# Patient Record
Sex: MALE | Race: BLACK OR AFRICAN AMERICAN | NOT HISPANIC OR LATINO | Employment: FULL TIME | URBAN - METROPOLITAN AREA
[De-identification: names, ages, dates, MRNs, and addresses within clinical notes are randomized per-mention and may not be internally consistent; named-entity substitution may affect disease eponyms.]

---

## 2017-02-27 ENCOUNTER — HOSPITAL ENCOUNTER (OUTPATIENT)
Dept: NUTRITION | Facility: CLINIC | Age: 37
Discharge: HOME | End: 2017-02-27

## 2017-02-27 DIAGNOSIS — R07.9 CHEST PAIN, UNSPECIFIED: ICD-10-CM

## 2017-02-27 DIAGNOSIS — E78.2 MIXED HYPERLIPIDEMIA: ICD-10-CM

## 2017-02-27 DIAGNOSIS — R73.03 PRE-DIABETES: ICD-10-CM

## 2017-02-27 DIAGNOSIS — K76.0 FATTY LIVER: ICD-10-CM

## 2021-06-01 ENCOUNTER — RECORDS - HEALTHEAST (OUTPATIENT)
Dept: ADMINISTRATIVE | Facility: CLINIC | Age: 41
End: 2021-06-01

## 2021-06-09 NOTE — PROGRESS NOTES
"Nutrition Assessment/education    Patient seen for out patient MNT inital assessment.    Referring diagnosis: Decrease CV risk, hx of prediabetes, hyperlipidemia, obesity, hypertension, hypertriglyceridemia  Height:5'11\"  Weight:  252 lbs   BMI:35.2  BMI indication: 35-39.9 obesity (class 2)   Weight goal:  230 lbs   Weight history:  Weighed 266 lbs in January, 2016                             Seven years ago he weighed 225 lbs.     Nutrition intervention that addressed medical nutrition therapy for above diagnosis: Discussed healthy food choices for an 1800 calorie diet with 14 grams of saturated fat per day, high fiber foods, limiting sodium to 1500 - 2400 mg sodium per day. Discussed recommended foods, reviewed label reading, eating out and recommended 150 minutes of moderate physical activity per week.     Assessment of diet/weight history: Patient stated he lost 14 pounds since January by watching portion sizes and increasing physical activity.  Typical meals include:  For breakfast and lunch a green smoothie that includes kale, berries and coconut water and he eats 2 hard boiled eggs for protein.  For dinner he eats a variety of foods including chicken, pork chops, ribs,  16 ounces of hamburger,  lots of rice and soto beans, peas, broccoli. He stated his meat serving sizes tend to be fairly large.  Patient reports he does not use much salt in cooking and does not buy many salty/processed foods but he does like pickles.   Patient reports he is real hungry after his breakfast and lunch meals  so he plans on adding a small serving of low sodium trail mix to his breakfast and lunch. Patient has been tracking his food on the [employer] ashley and is usually eating around 1500 calories. Beverages include green tea(no sugar added), he does not drink milk and seldom drinks alcohol.     Assessment of physical activity: Since January he started walking on the treadmill 5 days per week.  One day for 30 minutes and then " the next day for 60 minutes.  He also lifts 160 pound weights on Mondays, Wednesdays and Fridays. He does 10 reps.  He also does some curls using 40 pound weights.  He is also very physically active at work lifting boxes all day long.     Goals:  Patient stated he plans on watching his portion sizes closer.   He will cut back from 3 large hamburgers to 2 hamburgers and add more non starchy vegetables to his dinner meal. R ecommended he limit meat/chicken intake to about 6 ounces for dinner.  He plans on buying lean cuts of meats and continue to remove skin from poultry. He plans on buying a butter/canola blend instead of butter. He will watch portion sizes of soto beans (he buys the dried beans to limit sodium content).  Patient will add a low sodium trail mix to his breakfast and lunch meals to help curb his hunger level.   Patient will increase his  calories to 1800 calories per day (more realistic with his level of physical activity and age)  with 14 grams of saturated fat per day.  He plans on continuing to track his food intake on [employer] ashley.   Patient will read labels for saturated fat and sodium intake.   Patient plans on continuing with physical activity and once he reaches his weight goal of 230 pounds he plans on walking on the treadmill 3 days per week for one hour.       Patient had no barriers to learning, verbalized understanding, and compliance is expected.    Phone number provided for questions. Recommended follow-up in  as needed.    Thank you for this consult. Call me at [phone] for questions.  LAURIE Acevedo

## 2023-02-10 ENCOUNTER — TRANSFERRED RECORDS (OUTPATIENT)
Dept: HEALTH INFORMATION MANAGEMENT | Facility: CLINIC | Age: 43
End: 2023-02-10

## 2023-02-17 ENCOUNTER — TRANSFERRED RECORDS (OUTPATIENT)
Dept: HEALTH INFORMATION MANAGEMENT | Facility: CLINIC | Age: 43
End: 2023-02-17
Payer: COMMERCIAL

## 2023-03-08 ENCOUNTER — APPOINTMENT (OUTPATIENT)
Dept: RADIOLOGY | Facility: CLINIC | Age: 43
DRG: 730 | End: 2023-03-08
Attending: INTERNAL MEDICINE
Payer: COMMERCIAL

## 2023-03-08 ENCOUNTER — APPOINTMENT (OUTPATIENT)
Dept: ULTRASOUND IMAGING | Facility: CLINIC | Age: 43
DRG: 730 | End: 2023-03-08
Attending: EMERGENCY MEDICINE
Payer: COMMERCIAL

## 2023-03-08 ENCOUNTER — APPOINTMENT (OUTPATIENT)
Dept: CT IMAGING | Facility: CLINIC | Age: 43
DRG: 730 | End: 2023-03-08
Attending: NURSE PRACTITIONER
Payer: COMMERCIAL

## 2023-03-08 ENCOUNTER — HOSPITAL ENCOUNTER (INPATIENT)
Facility: CLINIC | Age: 43
LOS: 1 days | Discharge: HOME | DRG: 730 | End: 2023-03-10
Attending: EMERGENCY MEDICINE | Admitting: INTERNAL MEDICINE
Payer: COMMERCIAL

## 2023-03-08 DIAGNOSIS — N50.811 TESTICULAR PAIN, RIGHT: ICD-10-CM

## 2023-03-08 LAB
ALBUMIN SERPL-MCNC: 4.1 G/DL (ref 3.5–5)
ALBUMIN UR-MCNC: NEGATIVE MG/DL
ALP SERPL-CCNC: 67 U/L (ref 45–120)
ALT SERPL W P-5'-P-CCNC: 53 U/L (ref 0–45)
ANION GAP SERPL CALCULATED.3IONS-SCNC: 10 MMOL/L (ref 5–18)
APPEARANCE UR: CLEAR
AST SERPL W P-5'-P-CCNC: 23 U/L (ref 0–40)
BASOPHILS # BLD AUTO: 0 10E3/UL (ref 0–0.2)
BASOPHILS NFR BLD AUTO: 0 %
BILIRUB SERPL-MCNC: 1 MG/DL (ref 0–1)
BILIRUB UR QL STRIP: NEGATIVE
BUN SERPL-MCNC: 17 MG/DL (ref 8–22)
CALCIUM SERPL-MCNC: 9.5 MG/DL (ref 8.5–10.5)
CHLORIDE BLD-SCNC: 101 MMOL/L (ref 98–107)
CO2 SERPL-SCNC: 24 MMOL/L (ref 22–31)
COLOR UR AUTO: ABNORMAL
CREAT SERPL-MCNC: 1.09 MG/DL (ref 0.7–1.3)
EOSINOPHIL # BLD AUTO: 0 10E3/UL (ref 0–0.7)
EOSINOPHIL NFR BLD AUTO: 0 %
ERYTHROCYTE [DISTWIDTH] IN BLOOD BY AUTOMATED COUNT: 14.2 % (ref 10–15)
GFR SERPL CREATININE-BSD FRML MDRD: 86 ML/MIN/1.73M2
GLUCOSE BLD-MCNC: 243 MG/DL (ref 70–125)
GLUCOSE UR STRIP-MCNC: NEGATIVE MG/DL
HCT VFR BLD AUTO: 39.1 % (ref 40–53)
HGB BLD-MCNC: 12.6 G/DL (ref 13.3–17.7)
HGB UR QL STRIP: ABNORMAL
HOLD SPECIMEN: NORMAL
IMM GRANULOCYTES # BLD: 0 10E3/UL
IMM GRANULOCYTES NFR BLD: 0 %
KETONES UR STRIP-MCNC: NEGATIVE MG/DL
LEUKOCYTE ESTERASE UR QL STRIP: NEGATIVE
LYMPHOCYTES # BLD AUTO: 1 10E3/UL (ref 0.8–5.3)
LYMPHOCYTES NFR BLD AUTO: 9 %
MCH RBC QN AUTO: 27.6 PG (ref 26.5–33)
MCHC RBC AUTO-ENTMCNC: 32.2 G/DL (ref 31.5–36.5)
MCV RBC AUTO: 86 FL (ref 78–100)
MONOCYTES # BLD AUTO: 0.6 10E3/UL (ref 0–1.3)
MONOCYTES NFR BLD AUTO: 6 %
NEUTROPHILS # BLD AUTO: 8.9 10E3/UL (ref 1.6–8.3)
NEUTROPHILS NFR BLD AUTO: 85 %
NITRATE UR QL: NEGATIVE
NRBC # BLD AUTO: 0 10E3/UL
NRBC BLD AUTO-RTO: 0 /100
PH UR STRIP: 6 [PH] (ref 5–7)
PLATELET # BLD AUTO: 214 10E3/UL (ref 150–450)
POTASSIUM BLD-SCNC: 4.2 MMOL/L (ref 3.5–5)
PROCALCITONIN SERPL-MCNC: 0.21 NG/ML (ref 0–0.49)
PROT SERPL-MCNC: 7.5 G/DL (ref 6–8)
RBC # BLD AUTO: 4.57 10E6/UL (ref 4.4–5.9)
RBC URINE: <1 /HPF
SARS-COV-2 RNA RESP QL NAA+PROBE: NEGATIVE
SODIUM SERPL-SCNC: 135 MMOL/L (ref 136–145)
SP GR UR STRIP: 1.01 (ref 1–1.03)
UROBILINOGEN UR STRIP-MCNC: <2 MG/DL
WBC # BLD AUTO: 10.6 10E3/UL (ref 4–11)
WBC URINE: <1 /HPF

## 2023-03-08 PROCEDURE — 99223 1ST HOSP IP/OBS HIGH 75: CPT | Mod: AI | Performed by: INTERNAL MEDICINE

## 2023-03-08 PROCEDURE — U0005 INFEC AGEN DETEC AMPLI PROBE: HCPCS | Performed by: EMERGENCY MEDICINE

## 2023-03-08 PROCEDURE — 96376 TX/PRO/DX INJ SAME DRUG ADON: CPT

## 2023-03-08 PROCEDURE — 250N000011 HC RX IP 250 OP 636: Performed by: EMERGENCY MEDICINE

## 2023-03-08 PROCEDURE — 36415 COLL VENOUS BLD VENIPUNCTURE: CPT | Performed by: INTERNAL MEDICINE

## 2023-03-08 PROCEDURE — 87086 URINE CULTURE/COLONY COUNT: CPT | Performed by: NURSE PRACTITIONER

## 2023-03-08 PROCEDURE — 99285 EMERGENCY DEPT VISIT HI MDM: CPT | Mod: 25

## 2023-03-08 PROCEDURE — 258N000003 HC RX IP 258 OP 636: Performed by: INTERNAL MEDICINE

## 2023-03-08 PROCEDURE — 96374 THER/PROPH/DIAG INJ IV PUSH: CPT

## 2023-03-08 PROCEDURE — 99207 PR APP CREDIT; MD BILLING SHARED VISIT: CPT | Performed by: EMERGENCY MEDICINE

## 2023-03-08 PROCEDURE — C9803 HOPD COVID-19 SPEC COLLECT: HCPCS

## 2023-03-08 PROCEDURE — 84145 PROCALCITONIN (PCT): CPT | Performed by: INTERNAL MEDICINE

## 2023-03-08 PROCEDURE — 81001 URINALYSIS AUTO W/SCOPE: CPT | Performed by: EMERGENCY MEDICINE

## 2023-03-08 PROCEDURE — 250N000011 HC RX IP 250 OP 636: Performed by: INTERNAL MEDICINE

## 2023-03-08 PROCEDURE — 85025 COMPLETE CBC W/AUTO DIFF WBC: CPT | Performed by: INTERNAL MEDICINE

## 2023-03-08 PROCEDURE — 96375 TX/PRO/DX INJ NEW DRUG ADDON: CPT

## 2023-03-08 PROCEDURE — 87491 CHLMYD TRACH DNA AMP PROBE: CPT | Performed by: NURSE PRACTITIONER

## 2023-03-08 PROCEDURE — 71045 X-RAY EXAM CHEST 1 VIEW: CPT

## 2023-03-08 PROCEDURE — G0378 HOSPITAL OBSERVATION PER HR: HCPCS

## 2023-03-08 PROCEDURE — 74178 CT ABD&PLV WO CNTR FLWD CNTR: CPT

## 2023-03-08 PROCEDURE — 250N000013 HC RX MED GY IP 250 OP 250 PS 637: Performed by: NURSE PRACTITIONER

## 2023-03-08 PROCEDURE — 80053 COMPREHEN METABOLIC PANEL: CPT | Performed by: INTERNAL MEDICINE

## 2023-03-08 PROCEDURE — 36415 COLL VENOUS BLD VENIPUNCTURE: CPT | Performed by: FAMILY MEDICINE

## 2023-03-08 PROCEDURE — 93976 VASCULAR STUDY: CPT

## 2023-03-08 PROCEDURE — 87591 N.GONORRHOEAE DNA AMP PROB: CPT | Performed by: NURSE PRACTITIONER

## 2023-03-08 PROCEDURE — 96365 THER/PROPH/DIAG IV INF INIT: CPT

## 2023-03-08 PROCEDURE — 250N000013 HC RX MED GY IP 250 OP 250 PS 637: Performed by: EMERGENCY MEDICINE

## 2023-03-08 PROCEDURE — 96372 THER/PROPH/DIAG INJ SC/IM: CPT | Performed by: EMERGENCY MEDICINE

## 2023-03-08 PROCEDURE — 250N000013 HC RX MED GY IP 250 OP 250 PS 637: Performed by: INTERNAL MEDICINE

## 2023-03-08 PROCEDURE — 96367 TX/PROPH/DG ADDL SEQ IV INF: CPT

## 2023-03-08 PROCEDURE — 87040 BLOOD CULTURE FOR BACTERIA: CPT | Performed by: FAMILY MEDICINE

## 2023-03-08 RX ORDER — NALOXONE HYDROCHLORIDE 0.4 MG/ML
0.2 INJECTION, SOLUTION INTRAMUSCULAR; INTRAVENOUS; SUBCUTANEOUS
Status: DISCONTINUED | OUTPATIENT
Start: 2023-03-08 | End: 2023-03-10 | Stop reason: HOSPADM

## 2023-03-08 RX ORDER — HYDROCHLOROTHIAZIDE 25 MG/1
25 TABLET ORAL DAILY
Status: DISCONTINUED | OUTPATIENT
Start: 2023-03-08 | End: 2023-03-10 | Stop reason: HOSPADM

## 2023-03-08 RX ORDER — HYDROMORPHONE HCL IN WATER/PF 6 MG/30 ML
0.2 PATIENT CONTROLLED ANALGESIA SYRINGE INTRAVENOUS EVERY 4 HOURS PRN
Status: DISCONTINUED | OUTPATIENT
Start: 2023-03-08 | End: 2023-03-08

## 2023-03-08 RX ORDER — METOPROLOL TARTRATE 25 MG/1
50 TABLET, FILM COATED ORAL 2 TIMES DAILY
Status: DISCONTINUED | OUTPATIENT
Start: 2023-03-08 | End: 2023-03-10 | Stop reason: HOSPADM

## 2023-03-08 RX ORDER — CITALOPRAM HYDROBROMIDE 10 MG/1
40 TABLET ORAL DAILY
Status: DISCONTINUED | OUTPATIENT
Start: 2023-03-08 | End: 2023-03-10 | Stop reason: HOSPADM

## 2023-03-08 RX ORDER — SIMVASTATIN 40 MG
40 TABLET ORAL DAILY
COMMUNITY
Start: 2021-06-01 | End: 2023-03-21

## 2023-03-08 RX ORDER — ONDANSETRON 4 MG/1
4 TABLET, ORALLY DISINTEGRATING ORAL EVERY 6 HOURS PRN
Status: DISCONTINUED | OUTPATIENT
Start: 2023-03-08 | End: 2023-03-10 | Stop reason: HOSPADM

## 2023-03-08 RX ORDER — NALOXONE HYDROCHLORIDE 0.4 MG/ML
0.4 INJECTION, SOLUTION INTRAMUSCULAR; INTRAVENOUS; SUBCUTANEOUS
Status: DISCONTINUED | OUTPATIENT
Start: 2023-03-08 | End: 2023-03-10 | Stop reason: HOSPADM

## 2023-03-08 RX ORDER — DOXYCYCLINE 100 MG/1
100 CAPSULE ORAL EVERY 12 HOURS SCHEDULED
Status: DISCONTINUED | OUTPATIENT
Start: 2023-03-08 | End: 2023-03-08

## 2023-03-08 RX ORDER — ONDANSETRON 2 MG/ML
4 INJECTION INTRAMUSCULAR; INTRAVENOUS EVERY 6 HOURS PRN
Status: DISCONTINUED | OUTPATIENT
Start: 2023-03-08 | End: 2023-03-10 | Stop reason: HOSPADM

## 2023-03-08 RX ORDER — ACETAMINOPHEN 325 MG/1
650 TABLET ORAL EVERY 6 HOURS PRN
Status: DISCONTINUED | OUTPATIENT
Start: 2023-03-08 | End: 2023-03-10 | Stop reason: HOSPADM

## 2023-03-08 RX ORDER — SIMVASTATIN 20 MG
40 TABLET ORAL AT BEDTIME
Status: DISCONTINUED | OUTPATIENT
Start: 2023-03-08 | End: 2023-03-10 | Stop reason: HOSPADM

## 2023-03-08 RX ORDER — ACETAMINOPHEN 650 MG/1
650 SUPPOSITORY RECTAL EVERY 6 HOURS PRN
Status: DISCONTINUED | OUTPATIENT
Start: 2023-03-08 | End: 2023-03-10 | Stop reason: HOSPADM

## 2023-03-08 RX ORDER — ASPIRIN 81 MG/1
81 TABLET ORAL DAILY
COMMUNITY
End: 2023-03-21

## 2023-03-08 RX ORDER — NAPROXEN 250 MG/1
250 TABLET ORAL 2 TIMES DAILY WITH MEALS
Qty: 40 TABLET | Refills: 0 | Status: SHIPPED | OUTPATIENT
Start: 2023-03-08

## 2023-03-08 RX ORDER — HYDROCODONE BITARTRATE AND ACETAMINOPHEN 5; 325 MG/1; MG/1
1-2 TABLET ORAL EVERY 4 HOURS PRN
Status: DISCONTINUED | OUTPATIENT
Start: 2023-03-08 | End: 2023-03-10 | Stop reason: HOSPADM

## 2023-03-08 RX ORDER — HYDROCHLOROTHIAZIDE 25 MG/1
25 TABLET ORAL DAILY
COMMUNITY
Start: 2021-06-01 | End: 2023-03-21

## 2023-03-08 RX ORDER — DOXYCYCLINE 100 MG/1
100 CAPSULE ORAL EVERY 12 HOURS
Qty: 20 CAPSULE | Refills: 0 | Status: SHIPPED | OUTPATIENT
Start: 2023-03-08 | End: 2023-03-10

## 2023-03-08 RX ORDER — PIPERACILLIN SODIUM, TAZOBACTAM SODIUM 3; .375 G/15ML; G/15ML
3.38 INJECTION, POWDER, LYOPHILIZED, FOR SOLUTION INTRAVENOUS EVERY 8 HOURS
Status: DISCONTINUED | OUTPATIENT
Start: 2023-03-09 | End: 2023-03-10

## 2023-03-08 RX ORDER — KETOROLAC TROMETHAMINE 15 MG/ML
15 INJECTION, SOLUTION INTRAMUSCULAR; INTRAVENOUS EVERY 6 HOURS
Status: DISCONTINUED | OUTPATIENT
Start: 2023-03-08 | End: 2023-03-10 | Stop reason: HOSPADM

## 2023-03-08 RX ORDER — LISINOPRIL 10 MG/1
10 TABLET ORAL DAILY
Status: DISCONTINUED | OUTPATIENT
Start: 2023-03-08 | End: 2023-03-10 | Stop reason: HOSPADM

## 2023-03-08 RX ORDER — CITALOPRAM HYDROBROMIDE 40 MG/1
1 TABLET ORAL DAILY
COMMUNITY
Start: 2021-06-01 | End: 2023-03-21

## 2023-03-08 RX ORDER — METOPROLOL TARTRATE 50 MG
1 TABLET ORAL 2 TIMES DAILY
COMMUNITY
Start: 2021-06-01 | End: 2023-03-21

## 2023-03-08 RX ORDER — PIPERACILLIN SODIUM, TAZOBACTAM SODIUM 3; .375 G/15ML; G/15ML
3.38 INJECTION, POWDER, LYOPHILIZED, FOR SOLUTION INTRAVENOUS ONCE
Status: COMPLETED | OUTPATIENT
Start: 2023-03-08 | End: 2023-03-08

## 2023-03-08 RX ORDER — MULTIPLE VITAMINS W/ MINERALS TAB 9MG-400MCG
1 TAB ORAL DAILY
COMMUNITY

## 2023-03-08 RX ORDER — ASPIRIN 81 MG/1
81 TABLET ORAL DAILY
Status: DISCONTINUED | OUTPATIENT
Start: 2023-03-08 | End: 2023-03-10 | Stop reason: HOSPADM

## 2023-03-08 RX ORDER — LISINOPRIL 10 MG/1
1 TABLET ORAL DAILY
COMMUNITY
Start: 2021-06-01 | End: 2023-03-21

## 2023-03-08 RX ORDER — PIPERACILLIN SODIUM, TAZOBACTAM SODIUM 3; .375 G/15ML; G/15ML
3.38 INJECTION, POWDER, LYOPHILIZED, FOR SOLUTION INTRAVENOUS EVERY 8 HOURS
Status: DISCONTINUED | OUTPATIENT
Start: 2023-03-08 | End: 2023-03-08

## 2023-03-08 RX ORDER — MULTIPLE VITAMINS W/ MINERALS TAB 9MG-400MCG
1 TAB ORAL DAILY
Status: DISCONTINUED | OUTPATIENT
Start: 2023-03-08 | End: 2023-03-08

## 2023-03-08 RX ORDER — IOPAMIDOL 755 MG/ML
100 INJECTION, SOLUTION INTRAVASCULAR ONCE
Status: COMPLETED | OUTPATIENT
Start: 2023-03-08 | End: 2023-03-08

## 2023-03-08 RX ADMIN — PIPERACILLIN AND TAZOBACTAM 3.38 G: 3; .375 INJECTION, POWDER, FOR SOLUTION INTRAVENOUS at 21:23

## 2023-03-08 RX ADMIN — DOXYCYCLINE HYCLATE 100 MG: 100 CAPSULE ORAL at 20:10

## 2023-03-08 RX ADMIN — HYDROMORPHONE HYDROCHLORIDE 1 MG: 1 INJECTION, SOLUTION INTRAMUSCULAR; INTRAVENOUS; SUBCUTANEOUS at 02:57

## 2023-03-08 RX ADMIN — KETOROLAC TROMETHAMINE 15 MG: 15 INJECTION, SOLUTION INTRAMUSCULAR; INTRAVENOUS at 12:49

## 2023-03-08 RX ADMIN — LISINOPRIL 10 MG: 10 TABLET ORAL at 11:02

## 2023-03-08 RX ADMIN — HYDROCHLOROTHIAZIDE 25 MG: 25 TABLET ORAL at 11:01

## 2023-03-08 RX ADMIN — ASPIRIN 81 MG: 81 TABLET, COATED ORAL at 11:02

## 2023-03-08 RX ADMIN — HYDROCODONE BITARTRATE AND ACETAMINOPHEN 1 TABLET: 5; 325 TABLET ORAL at 11:09

## 2023-03-08 RX ADMIN — CITALOPRAM HYDROBROMIDE 40 MG: 10 TABLET, FILM COATED ORAL at 11:02

## 2023-03-08 RX ADMIN — METOPROLOL TARTRATE 50 MG: 25 TABLET, FILM COATED ORAL at 11:08

## 2023-03-08 RX ADMIN — IOPAMIDOL 100 ML: 755 INJECTION, SOLUTION INTRAVENOUS at 10:49

## 2023-03-08 RX ADMIN — VANCOMYCIN HYDROCHLORIDE 2500 MG: 500 INJECTION, POWDER, LYOPHILIZED, FOR SOLUTION INTRAVENOUS at 22:34

## 2023-03-08 RX ADMIN — ACETAMINOPHEN 650 MG: 325 TABLET ORAL at 19:48

## 2023-03-08 RX ADMIN — METOPROLOL TARTRATE 50 MG: 25 TABLET, FILM COATED ORAL at 20:10

## 2023-03-08 RX ADMIN — DOXYCYCLINE HYCLATE 100 MG: 100 CAPSULE ORAL at 11:08

## 2023-03-08 RX ADMIN — SIMVASTATIN 40 MG: 20 TABLET, FILM COATED ORAL at 20:09

## 2023-03-08 RX ADMIN — KETOROLAC TROMETHAMINE 15 MG: 15 INJECTION, SOLUTION INTRAMUSCULAR; INTRAVENOUS at 20:10

## 2023-03-08 ASSESSMENT — ACTIVITIES OF DAILY LIVING (ADL)
ADLS_ACUITY_SCORE: 35

## 2023-03-08 NOTE — PHARMACY-ADMISSION MEDICATION HISTORY
Pharmacy Note - Admission Medication History    Pertinent Provider Information:     Patient has not had any medications today. Last dose for any of his medications was yesterday PM.     ______________________________________________________________________    Prior To Admission (PTA) med list completed and updated in EMR.       PTA Med List   Medication Sig Last Dose     aspirin 81 MG EC tablet Take 81 mg by mouth daily 3/7/2023 at am     citalopram (CELEXA) 40 MG tablet Take 1 tablet by mouth daily 3/7/2023 at am     hydrochlorothiazide (HYDRODIURIL) 25 MG tablet Take 25 mg by mouth daily 3/7/2023 at am     lisinopril (ZESTRIL) 10 MG tablet Take 1 tablet by mouth daily 3/7/2023 at am     metoprolol tartrate (LOPRESSOR) 50 MG tablet Take 1 tablet by mouth 2 times daily 3/7/2023 at pm     multivitamin w/minerals (THERA-VIT-M) tablet Take 1 tablet by mouth daily 3/7/2023 at am     simvastatin (ZOCOR) 40 MG tablet Take 40 mg by mouth daily 3/7/2023 at pm       Information source(s): Patient  Method of interview communication: in-person      Patient was asked about OTC/herbal products specifically.  PTA med list reflects this.    In the past week, patient estimated taking medication this percent of the time:  greater than 90%.    Medication Affordability:  Not including over the counter (OTC) medications, was there a time in the past 12 months when you did not take your medications as prescribed because of cost?: No    Allergies were reviewed, assessed, and updated with the patient.      Patient does not use any multi-dose medications prior to admission.    The information provided in this note is only as accurate as the sources available at the time of the update(s).    Thank you for the opportunity to participate in the care of this patient.    CEE DAY  3/8/2023 7:49 AM

## 2023-03-08 NOTE — ED NOTES
Patient taken to US. Stable at this time. No signs of distress, sleeping, pain resolved and patient resting as a result of the medication. Will continue to monitor.

## 2023-03-08 NOTE — ED TRIAGE NOTES
Pt arrives from home c/o penile pain. Status post vasectomy on 02/10. Infection prior at site, took ATBs x10 days. Resolved, but pt has pain at site today at 1400 after coughing, went to Urgent Care and workup was neg, neg for testicular torsion per pt when this RN asked. Prescription given but pharmacy closed and pain became unbearable.     Hx. HTN, HLD      Triage Assessment     Row Name 03/08/23 0300       Triage Assessment (Adult)    Airway WDL WDL       Respiratory WDL    Respiratory WDL WDL       Skin Circulation/Temperature WDL    Skin Circulation/Temperature WDL WDL       Cardiac WDL    Cardiac WDL WDL       Peripheral/Neurovascular WDL    Peripheral Neurovascular WDL WDL       Cognitive/Neuro/Behavioral WDL    Cognitive/Neuro/Behavioral WDL WDL

## 2023-03-08 NOTE — ED TRIAGE NOTES
Triage Assessment     Row Name 03/08/23 0300       Triage Assessment (Adult)    Airway WDL WDL       Respiratory WDL    Respiratory WDL WDL       Skin Circulation/Temperature WDL    Skin Circulation/Temperature WDL WDL       Cardiac WDL    Cardiac WDL WDL       Peripheral/Neurovascular WDL    Peripheral Neurovascular WDL WDL       Cognitive/Neuro/Behavioral WDL    Cognitive/Neuro/Behavioral WDL WDL

## 2023-03-08 NOTE — H&P
[facility] MEDICINE ADMISSION HISTORY AND PHYSICAL       Assessment & Plan      1. Right testicular pain. Scrotal US showed --  No evidence of testicular torsion. 2. Moderate right hydrocele containing echogenic fluid, likely representing hemorrhagic or proteinaceous contents. 3. Small left hydrocele containing simple fluid.    No trauma. Not on thinners. No fevers. Low suspicion for infection at this point. However, needs close monitoring     - Poor pain control, and will continue non narcotic, narcotic meds  - Urology eval  - Repeat labs including procal, CBC    2. HTN   - PTA meds    3. Depression  - PTA meds       VTE prophylaxis: SCDs.    Diet: regular   Code Status: Full   COVID vaccination: yes  Barriers to discharge: admitting clinical condition  Discharge Disposition and goals:  Unable to determine at this point, pending clinical progress and response to treatment. Patient may need transfer to SNF or [facility] if unsafe to go home and needed treatment inappropriate at home setting OR may need home health care evaluation if care can be delivered at home settings. Consider referral to care manager/    PPE - I was wearing PPE when I met the patient including but not limited to - N95 mask, Gloves, and/or Safety glasses.      Care plan was created based on available information and patient's condition at the time of encounter. This was discussed with the patient and/or family members using layman's terms, including counseling/education and they have agreed to proceed. I recommend to revise care plan and to review history if there is change in condition and/or new clinical information that is not available during my encounter. At the end of night shift, this case will be presented to the AM Hospitalist.       Jose Gonzalez MD, MPH, FACP, [facility]  Internal Medicine - Hospitalist        Chief Complaint Testicular pain       HISTORY     - I met him in ED-14. He is here for testicular  pain, R.     - He was seen at urgent care for right testicular pain today. Scrotal US showed --  No evidence of testicular torsion. 2. Moderate right hydrocele containing echogenic fluid, likely representing hemorrhagic or proteinaceous contents. 3. Small left hydrocele containing simple fluid.    - Went home, and did OK, woke up after [place] - and pain recur, and unable to control at home  - No penile discharge. No fevers. He said, the pain was preceded by coughing really hard. He is better, no SOB    - History of vasectomy, Feb 10. Had an infection (orchitis and epididymitis) on February 17 and was put on antibiotics for 10 days which he completed.    - In the ED, repeat scrotal US was ordered. No labs.     - ROS --- No headache. No dizziness. No weakness. No CP or SOB. No palpitations. No abdominal pain. No nausea or vomiting. No urinary symptoms. No bleeding symptoms. No weight loss. Rest of 12 point ROS was reviewed and negative.       Past Medical History     HTN    Surgical History     Vasectomy     Family History      No testicular CA     Social History      .  Social History     Socioeconomic History     Marital status:      Spouse name: Not on file     Number of children: Not on file     Years of education: Not on file     Highest education level: Not on file   Occupational History     Not on file   Tobacco Use     Smoking status: Not on file     Smokeless tobacco: Not on file   Substance and Sexual Activity     Alcohol use: Not on file     Drug use: Not on file     Sexual activity: Not on file   Other Topics Concern     Not on file   Social History Narrative     Not on file     Social Determinants of Health     Financial Resource Strain: Not on file   Food Insecurity: Not on file   Transportation Needs: Not on file   Physical Activity: Not on file   Stress: Not on file   Social Connections: Not on file   Intimate Partner Violence: Not on file   Housing Stability: Not on file          Allergies      No  Known Allergies      Prior to Admission Medications      No current facility-administered medications on file prior to encounter.  No current outpatient medications on file prior to encounter.          Review of Systems     A 12 point comprehensive review of systems was negative except as noted above in HPI.    PHYSICAL EXAMINATION       Vitals      Vitals: BP (!) 147/81   Pulse 90   Temp 98.7  F (37.1  C) (Oral)   Resp 20   SpO2 99%   BMI= There is no height or weight on file to calculate BMI.      Examination     General Appearance:  Alert, cooperative, no distress  Head:    Normocephalic, without obvious abnormality, atraumatic  EENT:  PERRL, conjunctiva/corneas clear, EOM's intact.   Neck:   Supple, symmetrical, trachea midline, no adenopathy; no NVE  Back:  Symmetric, no curvature, no CVA tenderness  Chest/Lungs: Clear to auscultation bilaterally, respirations unlabored, No tenderness or deformity. No abdominal breathing or use of accessory muscles.   Heart:    Regular rate and rhythm, S1 and S2 normal, no murmur, rub   or gallop  Abdomen: Soft, non-tender, bowel sounds active all four quadrants, not peritoneal on palpation. Not distended  Extremities:  Extremities normal, atraumatic, no swelling   Skin:  Skin color, texture, turgor normal, no rashes or lesion - right scrotum is enlarged, tender to touch, not warm   Neurologic:  Awake and alert, No lateralizing or localizing signs       Pertinent Lab              Pertinent Radiology

## 2023-03-08 NOTE — ED NOTES
Patient wife left bedside: Yeimy [phone].     Patient resting at this time. Stable. Will continue to monitor.

## 2023-03-08 NOTE — CONSULTS
"    [facility] CONSULTATION - TESTICULAR PAIN     Type of Consult: inpatient  Place of Service: [facility]  Reason for consult: Right testicular pain  Request for consult by: Dr. Gonzalez    History of present illness:   Chencho Mane Jr. is a 42 year old male that was admitted for right testicular pain. Urology is being consulted for testicular pain. History obtained through patient and chart review.     Pt is s/p vasectomy 2/10/23. Seen by MIRELLA Mendez PA-C on 2/17/23 for right testicular pain and edema and prescribed 10 day course of doxycyline 100 mg po BID for right epididymitis. Patient reports he completed the antibiotic course and symptoms resolved.     Yesterday, patient was coughing and developed a \"twinge\" in the right testicle. Pain became severe later in the day, prompting him to visit the [facility]. Reports pain also was present in right flank and right abdomen and has resolved. Denies fever/chills. Scrotal/testicular US 3/7/23 showed no evidence of torsion, normal epididymis, moderate right hydrocele containing echogenic fluid (likely representing hemorrhagic or proteinaceous contents) and small left hydrocele containing simple fluid. UA 3/7: benign for infection. WBC 12.0 on 3/7, 10.6 today. Remains afebrile. Voiding without difficulty. Denies drainage from urethra. No penile pain. Denies scrotal trauma, no hx of testicular torsion or masses. Reports monogamous relationship recently. Hx of gonorrhea at age 15.     Patient notes significant improvement of right testicular pain with narcotic pain medication.     Past Medical History:  No past medical history on file.    Past Surgical history:  No past surgical history on file.    Social History:  Social History     Socioeconomic History     Marital status:      Spouse name: Not on file     Number of children: Not on file     Years of education: Not on file     Highest education level: Not on file   Occupational History "     Not on file   Tobacco Use     Smoking status: Never     Smokeless tobacco: Never   Substance and Sexual Activity     Alcohol use: Not on file     Drug use: Not on file     Sexual activity: Not on file   Other Topics Concern     Not on file   Social History Narrative     Not on file     Social Determinants of Health     Financial Resource Strain: Not on file   Food Insecurity: Not on file   Transportation Needs: Not on file   Physical Activity: Not on file   Stress: Not on file   Social Connections: Not on file   Intimate Partner Violence: Not on file   Housing Stability: Not on file       Medications:  Current Facility-Administered Medications   Medication     acetaminophen (TYLENOL) tablet 650 mg    Or     acetaminophen (TYLENOL) Suppository 650 mg     HYDROcodone-acetaminophen (NORCO) 5-325 MG per tablet 1-2 tablet     HYDROmorphone (DILAUDID) injection 0.2 mg     melatonin tablet 1 mg     naloxone (NARCAN) injection 0.2 mg    Or     naloxone (NARCAN) injection 0.4 mg    Or     naloxone (NARCAN) injection 0.2 mg    Or     naloxone (NARCAN) injection 0.4 mg     ondansetron (ZOFRAN ODT) ODT tab 4 mg    Or     ondansetron (ZOFRAN) injection 4 mg     Current Outpatient Medications   Medication     aspirin 81 MG EC tablet     citalopram (CELEXA) 40 MG tablet     hydrochlorothiazide (HYDRODIURIL) 25 MG tablet     lisinopril (ZESTRIL) 10 MG tablet     metoprolol tartrate (LOPRESSOR) 50 MG tablet     multivitamin w/minerals (THERA-VIT-M) tablet     simvastatin (ZOCOR) 40 MG tablet       Allergies:   No Known Allergies    Review of systems:   A full 10 point review of systems was taken and is negative aside from what is noted above in the HPI    Physical exam:  /60 (BP Location: Left arm)   Pulse 86   Temp 98  F (36.7  C) (Oral)   Resp 16   SpO2 95%    General: NAD, alert, cooperative  Head: normocephalic, without abnormality / atraumatic  Abdomen: soft, non- tender, non- distended. No suprapubic  fullness/tenderness.   Geniturinary: Significant tenderness noted with palpation and manipulation of the right testicle, right testicle more firm and enlarged than left. No scrotal edema, erythema, excessive warmth, crepitus, necrotic lesions and no left testicular pain or edema. Prostate smooth, non-tender on FERNANDO.  Skin: no rashes or lesions.  Musculoskeletal: moves all four extremities equally.   Psychological: alert and oriented, answers questions appropriately.     Labs:   Lab Results   Component Value Date     03/08/2023     11/30/2009    CO2 24 03/08/2023    CO2 37 11/30/2009    BUN 17 03/08/2023    BUN 20 11/30/2009     Lab Results   Component Value Date    WBC 10.6 03/08/2023    HGB 12.6 03/08/2023    HCT 39.1 03/08/2023    MCV 86 03/08/2023     03/08/2023       Lab Results: personally reviewed     Imaging:  EXAM: US TESTICULAR AND SCROTUM WITH DOPPLER LIMITED  LOCATION: [facility]  DATE/TIME: 3/8/2023 4:36 AM     INDICATION: Right testicular pain  COMPARISON: Ultrasound scrotum with Doppler evaluation the seventh 2023.  TECHNIQUE: Ultrasound of scrotum with color flow and spectral Doppler with waveform analysis performed.     FINDINGS:     RIGHT: Right testis measures 4.2 x 2.7 x 2.8 cm. No discrete testicular nodule or mass. A few small parenchymal calcifications, a nonspecific finding. Normal Doppler vascularity. Normal epididymis. Moderate hydrocele with a few echogenic foci. No   varicocele.     LEFT: Left testis measures 4.6 x 2.3 x 3.1 cm. No discrete testicular nodule or mass. A few scattered parenchymal calcifications, a nonspecific finding. Normal Doppler vascularity. Normal epididymis. Small hydrocele without echogenic foci. No varicocele.                                                                      IMPRESSION:  1.  No discrete testicular nodule/mass or abnormal vascularity to suggest torsion on either side. A few small parenchymal  calcifications in each testis, a nonspecific finding, unchanged.     2.  Moderate right and small left hydrocele, similar to prior.    I have personally reviewed the imaging reports above.     Assessment and Plan: Chencho Mane Jr. is being seen by [facility] for right testicular pain    - Ultrasound demonstrates: No discrete testicular nodule/mass or abnormal vascularity to suggest torsion on either side. A few small parenchymal calcifications in each testis, a nonspecific finding, unchanged. Moderate right and small left hydrocele. Normal bilateral epididymis.   - Right flank/abdominal pain on 3/7, now resolved.   - UA 3/7 benign for infection. Gonorrhea and chlamydia and urine culture ordered. Doxycycline 100 mg po BID ordered.   - Recommend scrotal support, restricting physical activity,  - Recommend 600 to 800 mg of ibuprofen every 8 hours as needed, may alternate with Tylenol 500 to 1000 mg every 8 hours as needed (do not exceed 3 g)  - Additional workup / testing ordered: CT Abd/Pelvis with contrast.   - Old records reviewed - Epic, [facility], [place]     This case was discussed with:  Dr Darnell Singh    Thank you for consulting [facility] regarding this patient's care. Please contact us with questions or concerns.     Eugene Landa, ARYA, CNP  [facility]    [phone]    ADDENDUM, 1715:   IMAGING:   EXAM: CT ABDOMEN PELVIS W/O and W CONTRAST  LOCATION: [facility]  DATE/TIME: 3/8/2023 11:01 AM     INDICATION: Persistent right right-sided testicular, flank and abdominal pain. Vasectomy 02/10/2023  COMPARISON: Scrotal ultrasounds 3/8/2023 and 3/7/2023, chest radiograph 04/26/2016 and CTA chest 11/30/2009.  TECHNIQUE: CT scan of the abdomen and pelvis was performed before and after injection of IV contrast. Multiplanar reformats were obtained. Dose reduction techniques were used.  CONTRAST: 100ml Isovue 370.       FINDINGS:   LOWER CHEST:  Reticulonodular opacities throughout the basilar segments of the left lower lobe are unchanged from 2009 and consistent with benign postinflammatory scarring. Benign calcified granuloma right lower lobe. Benign calcified right distal   paraesophageal posterior mediastinal lymph node. No pleural effusion. Heart size normal. No pericardial effusion.     HEPATOBILIARY: Moderate diffuse hepatic steatosis and mild hepatic enlargement. Liver is otherwise normal. Gallbladder is contracted. No bile duct dilatation.     PANCREAS: Normal.     SPLEEN: Spleen size within normal limits. Benign calcified granulomas spleen.     ADRENAL GLANDS: Normal.     KIDNEYS/BLADDER: Kidneys, ureters and bladder are normal. No urinary calculi or obstruction.     BOWEL: Normal appendix. No bowel obstruction or inflammatory change. No free air. No free fluid.     LYMPH NODES: Normal.     VASCULATURE: Normal.     PELVIC ORGANS: Vasectomy clips. Fluid in the right spermatic cord within which is a partially visualized varicocele and there is minimal contusion in the subcutaneous fat anterior to the right spermatic cord all of which is presumably related to recent   vasectomy. Prostate and seminal vesicles and intrapelvic portion of the vas differentia unremarkable.     MUSCULOSKELETAL: Normal.                                                                      IMPRESSION:   1.  Vasectomy clips. Fluid in the right spermatic cord within which is a partially visualized varicocele and there is minimal contusion in the subcutaneous fat anterior to the right spermatic cord all of which is presumably related to recent vasectomy..  2.  Moderate diffuse hepatic steatosis and mild hepatic enlargement.   3.  Kidneys, ureters and bladder are normal.    A/P: Right testicular pain  -Varicocele and minimal contusion of subcutaneous fat anterior to right spermatic cord noted on CT.   - UA benign for infection. UC and GG cultures pending. WBC 10.6. Afebrile.    -  Ok for patient to discharge when deemed appropriate per Medicine.   - Continue doxycycline BID at discharge.   - [facility] will arrange follow up with Dr. Singh on Friday, 3/10/23. Pt should return to ED in the interim if symptoms worsen, develops fever/chills or pain is not well controlled.     Updated: Dr. Singh and discussed plan with RN by phone.     Eugene Landa, APRN, CNP

## 2023-03-08 NOTE — ED NOTES
Handoff to Lexis BLANCO. Stable at this time. No signs of distress. Patient resting in bed at this time. Vitally stable. Patient verbalized resolution of pain.

## 2023-03-08 NOTE — ED PROVIDER NOTES
EMERGENCY DEPARTMENT ENCOUNTER      NAME: Chencho Mane Jr.  AGE: 42 year old male  YOB: 1980  MRN: [id number]  EVALUATION DATE & TIME: 3/8/2023  2:13 AM    PCP: Kat Serrato    ED PROVIDER: Edward Mancini D.O.      Chief Complaint   Patient presents with     Penis/Scrotum Problem     Pain       FINAL IMPRESSION:  1. Testicular pain, right        ED COURSE & MEDICAL DECISION MAKIN:45 AM I met with the patient to gather history and to perform my initial exam. I discussed the plan for care while in the Emergency Department.  3:00 AM I spoke with Dr. Agudelo, [facility], who recommends admitting the patient for pain control and he wants a repeat ultrasound and urology will see the patient in the morning.  3:03 AM I rechecked and updated the patient.  3:13 AM I spoke with Dr. Gonzalez, Hospitalist, who accepts the patient for admission.  3:53 AM I spoke with ultrasound.         Pertinent Labs & Imaging studies reviewed. (See chart for details)  42 year old male presents to the Emergency Department for evaluation of right testicular pain.  Initial differential included testicular torsion, epididymitis, hernia.  Patient did have ultrasound performed at the emergency room, that did show hydrocele with hemorrhagic products.  No evidence of epididymitis.  On exam he has no significant testicular swelling, and normal intact cremasteric reflex with normal testicular lie.  He did have flow on the testicle at the emergency room's ultrasound.  This time I do not believe this to represent testicular torsion as well.  However the patient is having very significant testicular pain, and after discussion with urology they recommended admission for pain medication, and they would evaluate him in the morning for further determination of other interventions that may be appropriate.  Discussed the hospitalist who agreed to the admission under observation status.    Medical Decision  Making    History:    Supplemental history from: Documented in chart, if applicable    External Record(s) reviewed: Documented in chart, if applicable.    Work Up:    Chart documentation includes differential considered and any EKGs or imaging independently interpreted by provider, where specified.    In additional to work up documented, I considered the following work up: Documented in chart, if applicable.    External consultation:    Discussion of management with another provider: Documented in chart, if applicable    Complicating factors:    Care impacted by chronic illness: N/A    Care affected by social determinants of health: N/A    Disposition considerations: Admit.        At the conclusion of the encounter I discussed the results of all of the tests and the disposition. The questions were answered. The patient or family acknowledged understanding and was agreeable with the care plan.      HPI    Patient information was obtained from: Patient and wife    Use of : N/A      Chencho Mane Jr. is a 42 year old male who presents to the ED by ambulance for evaluation of testicular pain.    Per Chart Review,   3/7/23 The patient presented to [facility] for evaluation of testicular pain. US Scrotum revealed 1. No evidence of testicular torsion. 2. Moderate right hydrocele containing echogenic fluid, likely representing hemorrhagic or proteinaceous contents. 3. Small left hydrocele containing simple fluid.    Patient reports that he developed a right-sided testicular pain today around 4:30 PM. He reports that his pain feels reminiscent of when he was diagnosed with epididymitis in the past. Patient notes that he had a vasectomy on 2/10 and then got epididymitis and was given a course of antibiotics that he finished on 2/27. Patient denies fevers, dysuria, trauma, or any other concerns at this time.    REVIEW OF SYSTEMS  Constitutional:  Denies fever, chills, weight loss or weakness  Eyes:   No pain, discharge, redness  HENT:  Denies sore throat, ear pain, congestion  Respiratory: No SOB, wheeze or cough  Cardiovascular:  No CP, palpitations  GI:  Denies abdominal pain, nausea, vomiting, diarrhea  : Endorses right-sided testicular pain. Denies dysuria, hematuria  Musculoskeletal:  Denies any new muscle/joint pain, swelling or loss of function.  Skin:  Denies rash, pallor  Neurologic:  Denies headache, focal weakness or sensory changes  Lymph: Denies swollen nodes    All other systems negative unless noted in HPI.    PAST MEDICAL HISTORY:  No past medical history on file.    PAST SURGICAL HISTORY:  No past surgical history on file.      CURRENT MEDICATIONS:    Current Facility-Administered Medications   Medication     acetaminophen (TYLENOL) tablet 650 mg    Or     acetaminophen (TYLENOL) Suppository 650 mg     HYDROcodone-acetaminophen (NORCO) 5-325 MG per tablet 1-2 tablet     HYDROmorphone (DILAUDID) injection 0.2 mg     melatonin tablet 1 mg     ondansetron (ZOFRAN ODT) ODT tab 4 mg    Or     ondansetron (ZOFRAN) injection 4 mg     No current outpatient medications on file.         ALLERGIES:  No Known Allergies    FAMILY HISTORY:  No family history on file.    SOCIAL HISTORY:  Social History     Socioeconomic History     Marital status:        VITALS:  Patient Vitals for the past 24 hrs:   BP Temp Temp src Pulse Resp SpO2   03/08/23 0253 (!) 147/81 98.7  F (37.1  C) Oral 90 20 99 %       PHYSICAL EXAM    VITAL SIGNS: BP (!) 147/81   Pulse 90   Temp 98.7  F (37.1  C) (Oral)   Resp 20   SpO2 99%     General Appearance: Well-appearing, well-nourished, no acute distress   Head:  Normocephalic, without obvious abnormality, atraumatic  Eyes:  PERRL, conjunctiva/corneas clear, EOM's intact,  ENT:  Lips, mucosa, and tongue normal, membranes are moist without pallor  Neck:  Normal ROM, symmetrical, trachea midline    Cardio:  Regular rate and rhythm, no murmur, rub or gallop, 2+ pulses  symmetric in all extremities  Pulm:  Clear to auscultation bilaterally, respirations unlabored,  Abdomen:  Soft, non-tender, no rebound or guarding.  : Pain to palpation primarily in the inferior portion of the right testicle. Cremasteric reflexes intact. No significant swelling.  No inguinal mass  Musculoskeletal: Full ROM, no edema, no cyanosis, good ROM of major joints  Integument:  Warm, Dry, No erythema, No rash.    Neurologic:  Alert & oriented.  No focal deficits appreciated.  Ambulatory.  Psychiatric:  Affect normal, Judgment normal, Mood normal.      LABS  Results for orders placed or performed during the hospital encounter of 03/08/23 (from the past 24 hour(s))   Asymptomatic COVID-19 Virus (Coronavirus) by PCR Nasopharyngeal    Specimen: Nasopharyngeal; Swab   Result Value Ref Range    SARS CoV2 PCR Negative Negative    Narrative    Testing was performed using the Xpert Xpress SARS-CoV-2 Assay on the Cepheid Gene-Xpert Instrument Systems. Additional information about this Emergency Use Authorization (EUA) assay can be found via the Lab Guide. This test should be ordered for the detection of SARS-CoV-2 in individuals who meet SARS-CoV-2 clinical and/or epidemiological criteria as well as from individuals without symptoms or other reasons to suspect COVID-19. Test performance for asymptomatic patients has only been established in anterior nasal swab specimens. This test is for in vitro diagnostic use under the FDA EUA for laboratories certified under CLIA to perform high complexity testing. This test has not been FDA cleared or approved. A negative result does not rule out the presence of PCR inhibitors in the specimen or target RNA concentration below the limit of detection for the assay. The possibility of a false negative should be considered if the patient's recent exposure or clinical presentation suggests COVID-19. This test was validated by the [facility]. This  laboratory is certified under the Clinical Laboratory Improvement Amendments (CLIA) as qualified to perform high complexity laboratory testing.     US Testicular & Scrotum w Doppler Ltd    Narrative    EXAM: US TESTICULAR AND SCROTUM WITH DOPPLER LIMITED  LOCATION: [facility]  DATE/TIME: 3/8/2023 4:36 AM    INDICATION: Right testicular pain  COMPARISON: Ultrasound scrotum with Doppler evaluation the seventh 2023.  TECHNIQUE: Ultrasound of scrotum with color flow and spectral Doppler with waveform analysis performed.    FINDINGS:    RIGHT: Right testis measures 4.2 x 2.7 x 2.8 cm. No discrete testicular nodule or mass. A few small parenchymal calcifications, a nonspecific finding. Normal Doppler vascularity. Normal epididymis. Moderate hydrocele with a few echogenic foci. No   varicocele.    LEFT: Left testis measures 4.6 x 2.3 x 3.1 cm. No discrete testicular nodule or mass. A few scattered parenchymal calcifications, a nonspecific finding. Normal Doppler vascularity. Normal epididymis. Small hydrocele without echogenic foci. No varicocele.      Impression    IMPRESSION:  1.  No discrete testicular nodule/mass or abnormal vascularity to suggest torsion on either side. A few small parenchymal calcifications in each testis, a nonspecific finding, unchanged.    2.  Moderate right and small left hydrocele, similar to prior.         RADIOLOGY  US Testicular & Scrotum w Doppler Ltd   Final Result   IMPRESSION:   1.  No discrete testicular nodule/mass or abnormal vascularity to suggest torsion on either side. A few small parenchymal calcifications in each testis, a nonspecific finding, unchanged.      2.  Moderate right and small left hydrocele, similar to prior.             MEDICATIONS GIVEN IN THE EMERGENCY:  Medications   melatonin tablet 1 mg (has no administration in time range)   ondansetron (ZOFRAN ODT) ODT tab 4 mg (has no administration in time range)     Or   ondansetron (ZOFRAN)  injection 4 mg (has no administration in time range)   acetaminophen (TYLENOL) tablet 650 mg (has no administration in time range)     Or   acetaminophen (TYLENOL) Suppository 650 mg (has no administration in time range)   HYDROcodone-acetaminophen (NORCO) 5-325 MG per tablet 1-2 tablet (has no administration in time range)   HYDROmorphone (DILAUDID) injection 0.2 mg (has no administration in time range)   HYDROmorphone (DILAUDID) injection 1 mg (1 mg Intramuscular $Given 3/8/23 9533)       NEW PRESCRIPTIONS STARTED AT TODAY'S ER VISIT  New Prescriptions    No medications on file        I, Zeeshan De Los Santos, am serving as a scribe to document services personally performed by Edward Mancini D.O., based on my observations and the provider's statements to me.  I, Edward Mancini D.O., attest that Zeeshan De Los Santos is acting in a scribe capacity, has observed my performance of the services and has documented them in accordance with my direction.     Edward Mancini D.O.  Emergency Medicine  [facility] EMERGENCY ROOM  [address] [facility] [id number] [phone]  Dept: [phone]       Edward Mancini,   03/08/23 0608

## 2023-03-08 NOTE — ED NOTES
Patient seen. Restless, c/o pain at this time. Triaged by this RN. Patient assessment as documented. Seen by Provider. To medicate by this RN. Partner at bedside. Will continue to monitor.

## 2023-03-08 NOTE — PROGRESS NOTES
[facility] MEDICINE PROGRESS NOTE      Summary:  42 year old male into [facility] on 3/7/2023 for right testicular pain  That seemed to begin abruptly after a coughing spell.  PMHx includes a vasectomy  By Dr Santiago with [facility] on 2/10/2023.  On 2/17/2023 he was seen for pain and   Edema. He was treated with doxycycline for a possible post op infection.  Abx lasted  For 10 days.  He resolved the symptoms.    Yesterday he coughed then soon after felt pain in his lower abdomen along  With into the right testicle.  Ultrasound imaging shows a moderately sized  Hydrocele.  (similary to previous imaging)  He was seen in the urgency room.    PSHx includes umbilical hernia repair.    Clinically he feels improved.  CT abdomen/pelvis is pending to rule out kidney  Stones.  Urology consult reviewed.  Likely this is due to the coughing event  From yesterday.  He will be supported with iv toradol and oral oxycodone if  Needed.  Expected hospital stay is short.  The plan was discussed with the  Patient.     Problem list:    1.  Right testicular pain: possible due to orchitis; urology note reviewed; pt was  Put on doxycyline; plan to possible discharge later today if imaging is negative    2.  HTN: continue home meds  3.  dvt prevention: ambulate      Violeta Colón MD  [facility]  [facility]  Phone: #[phone]  Securely message with the Vocera Web Console (learn more here)  Text page via [employer] Paging/Directory     Interval History/Subjective:      Physical Exam/Objective:  Temp:  [98  F (36.7  C)-98.7  F (37.1  C)] 98  F (36.7  C)  Pulse:  [80-90] 86  Resp:  [16-20] 16  BP: (133-154)/(60-81) 133/60  SpO2:  [93 %-99 %] 95 %  There is no height or weight on file to calculate BMI.    GENERAL:  Alert, appears comfortable, in no acute distress, appears stated age   HEAD:  Normocephalic, without obvious abnormality, atraumatic   EYES:  PERRL,  conjunctiva/corneas clear, no scleral icterus, EOM's intact   NOSE: Nares normal, septum midline, mucosa normal, no drainage   THROAT: Lips, mucosa, and tongue normal; teeth and gums normal, mouth moist   NECK: Supple, symmetrical, trachea midline   BACK:   Symmetric, no curvature, ROM normal   LUNGS:   Clear to auscultation bilaterally, no rales, rhonchi, or wheezing, symmetric chest rise on inhalation, respirations unlabored   CHEST WALL:  No tenderness or deformity   HEART:  Regular rate and rhythm, S1 and S2 normal, no murmur, rub, or gallop    ABDOMEN:   Soft, non-tender, bowel sounds active all four quadrants, no masses, no organomegaly, no rebound or guarding;  exam deferred to urology   EXTREMITIES: Extremities normal, atraumatic, no cyanosis or edema    SKIN: Dry to touch, no exanthems in the visualized areas   NEURO: Alert, oriented x3, moves all four extremities freely   PSYCH: Cooperative, behavior is appropriate      Data reviewed today: I personally reviewed all new medications, labs, imaging/diagnostics reports over the past 24 hours. Pertinent findings include:    Imaging:   Recent Results (from the past 24 hour(s))   US Testicular & Scrotum w Doppler Ltd    Narrative    EXAM: US TESTICULAR AND SCROTUM WITH DOPPLER LIMITED  LOCATION: [facility]  DATE/TIME: 3/8/2023 4:36 AM    INDICATION: Right testicular pain  COMPARISON: Ultrasound scrotum with Doppler evaluation the seventh 2023.  TECHNIQUE: Ultrasound of scrotum with color flow and spectral Doppler with waveform analysis performed.    FINDINGS:    RIGHT: Right testis measures 4.2 x 2.7 x 2.8 cm. No discrete testicular nodule or mass. A few small parenchymal calcifications, a nonspecific finding. Normal Doppler vascularity. Normal epididymis. Moderate hydrocele with a few echogenic foci. No   varicocele.    LEFT: Left testis measures 4.6 x 2.3 x 3.1 cm. No discrete testicular nodule or mass. A few scattered parenchymal  calcifications, a nonspecific finding. Normal Doppler vascularity. Normal epididymis. Small hydrocele without echogenic foci. No varicocele.      Impression    IMPRESSION:  1.  No discrete testicular nodule/mass or abnormal vascularity to suggest torsion on either side. A few small parenchymal calcifications in each testis, a nonspecific finding, unchanged.    2.  Moderate right and small left hydrocele, similar to prior.       Labs:  Most Recent 3 BMP's:Recent Labs   Lab Test 03/08/23  0639   *   POTASSIUM 4.2   CHLORIDE 101   CO2 24   BUN 17   CR 1.09   ANIONGAP 10   MAYELA 9.5   *       Medications:   Personally Reviewed.  Medications       aspirin  81 mg Oral Daily     citalopram  40 mg Oral Daily     doxycycline hyclate  100 mg Oral Q12H [facility] (08/20)     hydrochlorothiazide  25 mg Oral Daily     lisinopril  10 mg Oral Daily     metoprolol tartrate  50 mg Oral BID     simvastatin  40 mg Oral At Bedtime

## 2023-03-09 PROBLEM — N50.811 TESTICULAR PAIN, RIGHT: Status: ACTIVE | Noted: 2023-03-09

## 2023-03-09 LAB
ALBUMIN UR-MCNC: NEGATIVE MG/DL
ANION GAP SERPL CALCULATED.3IONS-SCNC: 14 MMOL/L (ref 5–18)
ANION GAP SERPL CALCULATED.3IONS-SCNC: 9 MMOL/L (ref 5–18)
APPEARANCE UR: CLEAR
BACTERIA UR CULT: NO GROWTH
BILIRUB UR QL STRIP: NEGATIVE
BUN SERPL-MCNC: 15 MG/DL (ref 8–22)
BUN SERPL-MCNC: 16 MG/DL (ref 8–22)
C TRACH DNA SPEC QL PROBE+SIG AMP: NEGATIVE
CALCIUM SERPL-MCNC: 9.5 MG/DL (ref 8.5–10.5)
CALCIUM SERPL-MCNC: 9.7 MG/DL (ref 8.5–10.5)
CHLORIDE BLD-SCNC: 100 MMOL/L (ref 98–107)
CHLORIDE BLD-SCNC: 101 MMOL/L (ref 98–107)
CO2 SERPL-SCNC: 23 MMOL/L (ref 22–31)
CO2 SERPL-SCNC: 26 MMOL/L (ref 22–31)
COLOR UR AUTO: COLORLESS
CREAT SERPL-MCNC: 1.22 MG/DL (ref 0.7–1.3)
CREAT SERPL-MCNC: 1.22 MG/DL (ref 0.7–1.3)
ERYTHROCYTE [DISTWIDTH] IN BLOOD BY AUTOMATED COUNT: 14.6 % (ref 10–15)
GFR SERPL CREATININE-BSD FRML MDRD: 75 ML/MIN/1.73M2
GFR SERPL CREATININE-BSD FRML MDRD: 75 ML/MIN/1.73M2
GLUCOSE BLD-MCNC: 182 MG/DL (ref 70–125)
GLUCOSE BLD-MCNC: 219 MG/DL (ref 70–125)
GLUCOSE UR STRIP-MCNC: NEGATIVE MG/DL
HCT VFR BLD AUTO: 40.2 % (ref 40–53)
HGB BLD-MCNC: 12.9 G/DL (ref 13.3–17.7)
HGB UR QL STRIP: NEGATIVE
KETONES UR STRIP-MCNC: NEGATIVE MG/DL
LACTATE SERPL-SCNC: 1.3 MMOL/L (ref 0.7–2)
LEUKOCYTE ESTERASE UR QL STRIP: NEGATIVE
MCH RBC QN AUTO: 27.9 PG (ref 26.5–33)
MCHC RBC AUTO-ENTMCNC: 32.1 G/DL (ref 31.5–36.5)
MCV RBC AUTO: 87 FL (ref 78–100)
N GONORRHOEA DNA SPEC QL NAA+PROBE: NEGATIVE
NITRATE UR QL: NEGATIVE
PH UR STRIP: 6 [PH] (ref 5–7)
PLATELET # BLD AUTO: 210 10E3/UL (ref 150–450)
POTASSIUM BLD-SCNC: 3.8 MMOL/L (ref 3.5–5)
POTASSIUM BLD-SCNC: 3.8 MMOL/L (ref 3.5–5)
RBC # BLD AUTO: 4.62 10E6/UL (ref 4.4–5.9)
RBC URINE: <1 /HPF
SODIUM SERPL-SCNC: 136 MMOL/L (ref 136–145)
SODIUM SERPL-SCNC: 137 MMOL/L (ref 136–145)
SP GR UR STRIP: 1.01 (ref 1–1.03)
SQUAMOUS EPITHELIAL: <1 /HPF
UROBILINOGEN UR STRIP-MCNC: <2 MG/DL
WBC # BLD AUTO: 6.5 10E3/UL (ref 4–11)
WBC URINE: 1 /HPF

## 2023-03-09 PROCEDURE — 80048 BASIC METABOLIC PNL TOTAL CA: CPT | Performed by: INTERNAL MEDICINE

## 2023-03-09 PROCEDURE — 36415 COLL VENOUS BLD VENIPUNCTURE: CPT | Performed by: EMERGENCY MEDICINE

## 2023-03-09 PROCEDURE — 87798 DETECT AGENT NOS DNA AMP: CPT | Performed by: INTERNAL MEDICINE

## 2023-03-09 PROCEDURE — 36415 COLL VENOUS BLD VENIPUNCTURE: CPT | Performed by: INTERNAL MEDICINE

## 2023-03-09 PROCEDURE — 99222 1ST HOSP IP/OBS MODERATE 55: CPT | Performed by: INTERNAL MEDICINE

## 2023-03-09 PROCEDURE — 250N000011 HC RX IP 250 OP 636: Performed by: INTERNAL MEDICINE

## 2023-03-09 PROCEDURE — 81001 URINALYSIS AUTO W/SCOPE: CPT | Performed by: INTERNAL MEDICINE

## 2023-03-09 PROCEDURE — 258N000003 HC RX IP 258 OP 636: Performed by: INTERNAL MEDICINE

## 2023-03-09 PROCEDURE — 120N000001 HC R&B MED SURG/OB

## 2023-03-09 PROCEDURE — 250N000013 HC RX MED GY IP 250 OP 250 PS 637: Performed by: INTERNAL MEDICINE

## 2023-03-09 PROCEDURE — 96366 THER/PROPH/DIAG IV INF ADDON: CPT

## 2023-03-09 PROCEDURE — G0378 HOSPITAL OBSERVATION PER HR: HCPCS

## 2023-03-09 PROCEDURE — 250N000013 HC RX MED GY IP 250 OP 250 PS 637: Performed by: EMERGENCY MEDICINE

## 2023-03-09 PROCEDURE — 96376 TX/PRO/DX INJ SAME DRUG ADON: CPT

## 2023-03-09 PROCEDURE — 87040 BLOOD CULTURE FOR BACTERIA: CPT | Performed by: EMERGENCY MEDICINE

## 2023-03-09 PROCEDURE — 85027 COMPLETE CBC AUTOMATED: CPT | Performed by: INTERNAL MEDICINE

## 2023-03-09 PROCEDURE — 82310 ASSAY OF CALCIUM: CPT | Performed by: EMERGENCY MEDICINE

## 2023-03-09 PROCEDURE — 83605 ASSAY OF LACTIC ACID: CPT | Performed by: EMERGENCY MEDICINE

## 2023-03-09 PROCEDURE — 250N000011 HC RX IP 250 OP 636: Performed by: EMERGENCY MEDICINE

## 2023-03-09 PROCEDURE — 99232 SBSQ HOSP IP/OBS MODERATE 35: CPT | Performed by: EMERGENCY MEDICINE

## 2023-03-09 RX ADMIN — HYDROCODONE BITARTRATE AND ACETAMINOPHEN 1 TABLET: 5; 325 TABLET ORAL at 06:51

## 2023-03-09 RX ADMIN — VANCOMYCIN HYDROCHLORIDE 1250 MG: 5 INJECTION, POWDER, LYOPHILIZED, FOR SOLUTION INTRAVENOUS at 22:45

## 2023-03-09 RX ADMIN — ACETAMINOPHEN 650 MG: 325 TABLET ORAL at 17:17

## 2023-03-09 RX ADMIN — HYDROCHLOROTHIAZIDE 25 MG: 25 TABLET ORAL at 07:57

## 2023-03-09 RX ADMIN — KETOROLAC TROMETHAMINE 15 MG: 15 INJECTION, SOLUTION INTRAMUSCULAR; INTRAVENOUS at 06:41

## 2023-03-09 RX ADMIN — KETOROLAC TROMETHAMINE 15 MG: 15 INJECTION, SOLUTION INTRAMUSCULAR; INTRAVENOUS at 23:11

## 2023-03-09 RX ADMIN — SIMVASTATIN 40 MG: 20 TABLET, FILM COATED ORAL at 21:11

## 2023-03-09 RX ADMIN — PIPERACILLIN AND TAZOBACTAM 3.38 G: 3; .375 INJECTION, POWDER, FOR SOLUTION INTRAVENOUS at 11:15

## 2023-03-09 RX ADMIN — PIPERACILLIN AND TAZOBACTAM 3.38 G: 3; .375 INJECTION, POWDER, FOR SOLUTION INTRAVENOUS at 04:06

## 2023-03-09 RX ADMIN — VANCOMYCIN HYDROCHLORIDE 1250 MG: 5 INJECTION, POWDER, LYOPHILIZED, FOR SOLUTION INTRAVENOUS at 09:29

## 2023-03-09 RX ADMIN — CITALOPRAM HYDROBROMIDE 40 MG: 10 TABLET, FILM COATED ORAL at 07:57

## 2023-03-09 RX ADMIN — LISINOPRIL 10 MG: 10 TABLET ORAL at 07:58

## 2023-03-09 RX ADMIN — PIPERACILLIN AND TAZOBACTAM 3.38 G: 3; .375 INJECTION, POWDER, FOR SOLUTION INTRAVENOUS at 21:08

## 2023-03-09 RX ADMIN — KETOROLAC TROMETHAMINE 15 MG: 15 INJECTION, SOLUTION INTRAMUSCULAR; INTRAVENOUS at 12:04

## 2023-03-09 RX ADMIN — METOPROLOL TARTRATE 50 MG: 25 TABLET, FILM COATED ORAL at 07:57

## 2023-03-09 RX ADMIN — ASPIRIN 81 MG: 81 TABLET, COATED ORAL at 07:58

## 2023-03-09 RX ADMIN — KETOROLAC TROMETHAMINE 15 MG: 15 INJECTION, SOLUTION INTRAMUSCULAR; INTRAVENOUS at 01:18

## 2023-03-09 RX ADMIN — METOPROLOL TARTRATE 50 MG: 25 TABLET, FILM COATED ORAL at 21:11

## 2023-03-09 RX ADMIN — KETOROLAC TROMETHAMINE 15 MG: 15 INJECTION, SOLUTION INTRAMUSCULAR; INTRAVENOUS at 18:09

## 2023-03-09 ASSESSMENT — ACTIVITIES OF DAILY LIVING (ADL)
ADLS_ACUITY_SCORE: 35

## 2023-03-09 NOTE — PROGRESS NOTES
PRIMARY DIAGNOSIS: ACUTE PAIN  OUTPATIENT/OBSERVATION GOALS TO BE MET BEFORE DISCHARGE:  1. Pain Status: Improved-controlled with oral pain medications.    2. Return to near baseline physical activity: Yes    3. Cleared for discharge by consultants (if involved): No    Nurse to notify provider when observation goals have been met and patient is ready for discharge.    Outcome Evaluation: Blood cultures and urinalysis collected. Chest x-ray completed. IV antibiotics infused. Temp 98.2 after tylenol/cold packs. Patient appears more comfortable, no longer clammy. Pain rated 2/10. Patient's spouse, Yeimy, called and was given an update on improvement to fever and plan for infection workup.

## 2023-03-09 NOTE — PROVIDER NOTIFICATION
Paged on-call hospitalist Dr. Nel Jefferson and cross cover Dr. Jimmy Hopper.    Per shift change report, discharge was delayed due to patient complaints of dizziness. His plan was to eat dinner and then go home. When this RN took over he complained of chills, headache, and fatigue. He looks diaphoretic. Temp 103.1 Tylenol administered.

## 2023-03-09 NOTE — PROGRESS NOTES
Place of Service:  [facility]     Reason for follow up: Right testicular pain    SUBJECTIVE:  Events: Fever 103.1 on 3/8 afternoon, 101 overnight, afebrile this AM. Received 2 doses of doxycycline on 3/8, changed to IV Zosyn and Vanco once developed fever.     Patient reports no significant change in right testicular pain or edema, maybe slightly improved from yesterday. Denies left testicular pain, scrotal pain, perineal pain, SP/bilateral flank pain, difficulty voiding.      Diaphoretic this AM but currently afebrile.    Spouse, Yeimy, present.     OBJECTIVE:  PHYSICAL EXAM:  Temp: 98.1  F (36.7  C) Temp src: Oral BP: 126/59 Pulse: 72   Resp: 16 SpO2: 95 % O2 Device: None (Room air)    General: NAD, alert, cooperative  Head: normocephalic, without abnormality / atraumatic  Abdomen: soft, non- tender, non- distended. No suprapubic fullness, no suprapubic tenderness. No bilateral CVA tenderness.   Genitourinary: Significant tenderness noted with palpation and manipulation of the right testicle, right testicle more firm and enlarged than left. No scrotal edema, erythema, excessive warmth, crepitus or necrotic lesions. No left testicular pain or edema.  Skin: No rashes or lesions  Musculoskeletal: moves all four extremities equally.   Psychological: alert and oriented, answers questions appropriately.     LABS:  Creatinine   Date Value Ref Range Status   03/09/2023 1.22 0.70 - 1.30 mg/dL Final   11/30/2009 1.28 (H) 0.66 - 1.25 mg/dL Final     Comment:     New IDMS-traceable calibration  beginning 5/1/08     WBC Count   Date Value Ref Range Status   03/09/2023 6.5 4.0 - 11.0 10e3/uL Final     Hemoglobin   Date Value Ref Range Status   03/09/2023 12.9 (L) 13.3 - 17.7 g/dL Final     Platelet Count   Date Value Ref Range Status   03/09/2023 210 150 - 450 10e3/uL Final       UA:  UA RESULTS:  Recent Labs   Lab Test 03/09/23  0047   COLOR Colorless   APPEARANCE Clear   URINEGLC Negative   URINEBILI Negative    URINEKETONE Negative   SG 1.009   UBLD Negative   URINEPH 6.0   PROTEIN Negative   NITRITE Negative   LEUKEST Negative   RBCU <1   WBCU 1       Cultures:  Urine 3/8/23: pending  Blood 3/8/23: pending  Rio/Chlamydia 3/8/23: pending     Lab Results: personally reviewed.     ASSESSMENT/PLAN:  Chencho Mane Jr. is being seen by [facility] for right testicular pain, suspected epididymo-orchitis    - Fever last evening and overnight. Afebrile this AM. WBC 6.5.  - Testicular/scrotal US 3/8 negative for mass/nodule or abnormal vascularity to suggest to torsion. Moderate right and small left hydroceles noted. CT Abd/Pelvis 3/8: fluid in right spermatic cord,partially visualized varicocele, minimal contusion in subcutaneous fat anterior to right spermatic cord s/p vasectomy.   - UAs benign for infection. UC, BCs and GC culture pending. Received 2 doses of po doxycycline on 3/8, now IV Zosyn and Vanco. Afebrile. WBC WNL. Appreciate ID consult/recommendations.   - No change in exam today. No evidence of scrotal/perineal cellulitis or Florencia's. No surgical intervention indicated at this time.   - Recommend scrotal support, restricting physical activity,  - Recommend 600 to 800 mg of ibuprofen every 8 hours as needed, may alternate with Tylenol 500 to 1000 mg every 8 hours as needed (do not exceed 3 g).  - Will arrange for close follow up with Dr. Singh, timing to be determined by date of discharge.     Updating:  Dr Darnell Landa, ARYA, CNP  [facility]   [phone]     ADDENDUM, 1135:   Case discussed with Dr. Singh.   Afebrile since [place]. If fevers recur, then make NPO.   Dr. Singh planning to see patient this evening.     ARYA Alan, CNP    Patient was seen and examined by me this evening.  He reports that his pain is much improved over the last 36 hours,  He had fevers last night to 103 but has been afebrile x24 hours.  On examination there is tenderness and  induration of the right testis and epididymis.  There is no fluctuance within the scrotal cavity. Plan: There is no indication for surgical intervention. Cont IV abx.  Anticipate transitioning to PO tomorrow.  I would ask ID if it would make sense to add doxycycline to PO levaquin given that he responded to this initially to a simlilar but much milder episode 2 weeks ago?     Darnell Singh MD  3/9/2023  9:53 PM

## 2023-03-09 NOTE — UTILIZATION REVIEW
Admission Status; Secondary Review Determination   Under the authority of the Utilization Management Committee, the utilization review process indicated a secondary review on Chencho Mane Jr.. The review outcome is based on review of the medical records, discussions with staff, and applying clinical experience noted on the date of the review.   (x) Inpatient Status Appropriate - This patient's medical care is consistent with medical management for inpatient care and reasonable inpatient medical practice.     RATIONALE FOR DETERMINATION   Chencho Mane Jr. Is a 42 yr old male who underwent vasectomy on 2/10/23 and presented with testicular pain after coughing on 3/8/23.  He had pain and edema on 2/17/23 and was treated with doxycycline for possible postop infection for 10 days and symptoms resolved.  On 3/7/23 he had pain in right testicle with coughing.  Was started on doxycycline while monitored on Observation and then plan to discharge however developed temp to 103 and discharge was cancelled.  Ongoing fever to 101.  Doxycycline changed to vanco/zosyn.  ID consultation pending.     At the time of admission with the information available to the attending physician more than 2 nights Hospital complex care was anticipated, based on patient risk of adverse outcome if treated as outpatient and complex care required. Inpatient admission is appropriate based on the Medicare guidelines.   The information on this document is developed by the utilization review team in order for the business office to ensure compliance. This only denotes the appropriateness of proper admission status and does not reflect the quality of care rendered.   The definitions of Inpatient Status and Observation Status used in making the determination above are those provided in the CMS Coverage Manual, Chapter 1 and Chapter 6, section 70.4.   Sincerely,   Kamla Helm MD  Utilization Review  Physician Advisor  [facility]  Services

## 2023-03-09 NOTE — PROGRESS NOTES
Consultation - Infectious Disease  [facility]  Chencho Mane Jr.,  1980, MRN [id number]    Admitting Dx: Testicular pain, right [N50.811]    PCP: Kat Serrato, [phone]       ASSESSMENT   42-year-old man who is admitted with right testicular pain.  ID is consulted regarding fevers and pain.    1. Status postvasectomy on 2/10 without complications  2. Right testicular pain.  Started on .  Improved with a course of doxycycline.  Worsening pain developed after episode of coughing 2 days ago.  Unremarkable scrotal ultrasound, but CT scan showing small varicocele and postvasectomy changes.  Denies STI risk factors.  GC/chlamydia urine probe negative.  3. Fever.  Only focal finding is right testicular pain.  Imaging does not show any obvious infection or occult abscess.  Recent URI symptoms with cough and congestion.  COVID screen negative.  Normal white count and procalcitonin.  Blood cultures negative to date.  No parotid swelling.    Active Problems:    * No active hospital problems. *       PLAN   -Agree with empiric vancomycin and Zosyn  -Appreciate urology involvement  -Watch for fever recurrence  -Check urine for mycoplasma  -If symptoms improve, and no further fevers, consider switching to oral levofloxacin x10 days tomorrow      Thank you for this consult. Will follow.    Jose Preciado MD  [facility]  Direct messaging: TVplus Paging  On-Call ID provider: [phone], option: [phone]      ===========================================      Chief Complaint   <principal problem not specified>       HPI     We have been requested by Violeta Colón MD to evaluate Chencho Mane Jr. for the above.    History obtained by patient    Chencho Mane Jr. is a 42 year old man who is admitted with right testicular pain.  The patient had a vasectomy on 2/10 without complications.  However, on  he returned to clinic with right testicular pain.  He was treated  "empirically with doxycycline x10 days which helped.  This past week he noticed the beginnings of a upper respiratory infection.  Tuesday he had a strong cough while he was in his car and developed instant onset of right testicular pain.  Due to severity of pain he came into the ER.  He has been evaluated by urology.  No obvious signs of infection were seen on exam.  Normal prostate exam.  Ultrasound only showed small hydroceles bilaterally.  In the evening he developed a high fever without any other associated symptoms.  A CT scan of the abdomen and pelvis was done which showed no obvious infection, but a small varicocele on the right.  Blood cultures were collected and the patient was started on broad-spectrum antibiotics.  Today, he feels like the pain is improved.  He continues to have point tenderness over the his right testicle.  He denies any rashes or dysuria or urethral discharge.          Review of Systems   Ten systems reviewed and negative except for what is noted in the HPI       Medical History  No past medical history on file. Surgical History  He  has no past surgical history on file.     Social History  Reviewed, and he  reports that he has never smoked. He has never used smokeless tobacco.  Social History     Social History Narrative     Not on file     Family History  family history is not on file.  family history reviewed and is not pertinent to the presenting problem.            Allergies   No Known Allergies      Antibiotics   Doxycycline 3/8-  Zosyn 3/8-  Vancomycin 3/8-    Previous:  None      Physical Exam     Temp:  [98.1  F (36.7  C)-103.1  F (39.5  C)] 98.1  F (36.7  C)  Pulse:  [] 81  Resp:  [14-16] 16  BP: (126-160)/(59-70) 132/70  SpO2:  [94 %-97 %] 97 %    /70   Pulse 81   Temp 98.1  F (36.7  C) (Oral)   Resp 16   Ht 1.803 m (5' 11\")   Wt 117.9 kg (260 lb)   SpO2 97%   BMI 36.26 kg/m      GENERAL:  well-developed, well-nourished, lying in bed in no acute distress. "   HENT:  Head is normocephalic, atraumatic. Oropharynx is moist without exudates or ulcers.  EYES:  Eyes have anicteric sclerae without conjunctival injection or stigmata of endocarditis.   NECK:  Supple.  No lymphadenopathy  LUNGS:  Clear to auscultation.  CARDIOVASCULAR:  Regular rate and rhythm with no murmurs, gallops or rubs.  ABDOMEN:  Normal bowel sounds, soft, nontender. No appreciable hepatosplenomegaly  : Normal external male genitalia without ulcers or lesions. Right testicle is tender with fullness near the epididymis   EXT: Extremities warm and without edema.  SKIN:  No acute rashes. No stigmata of endocarditis.  NEUROLOGIC:  Grossly nonfocal.      Cultures     3/8 blood cultures x2: No growth to date  3/8 urine culture: Negative    Laboratory results     Recent Labs   Lab 03/09/23  0801 03/08/23  0639   WBC 6.5 10.6   HGB 12.9* 12.6*    214       Recent Labs   Lab 03/09/23  0801 03/08/23  0639    135*   CO2 23 24   BUN 15 17   ALBUMIN  --  4.1   ALKPHOS  --  67   ALT  --  53*   AST  --  23       No results for input(s): CRP in the last 168 hours.    Invalid input(s): SEDRATE        Imaging   Radiology results reviewed    US Testicular & Scrotum w Doppler Ltd    Result Date: 3/8/2023  EXAM: US TESTICULAR AND SCROTUM WITH DOPPLER LIMITED LOCATION: [facility] DATE/TIME: 3/8/2023 4:36 AM INDICATION: Right testicular pain COMPARISON: Ultrasound scrotum with Doppler evaluation the seventh 2023. TECHNIQUE: Ultrasound of scrotum with color flow and spectral Doppler with waveform analysis performed. FINDINGS: RIGHT: Right testis measures 4.2 x 2.7 x 2.8 cm. No discrete testicular nodule or mass. A few small parenchymal calcifications, a nonspecific finding. Normal Doppler vascularity. Normal epididymis. Moderate hydrocele with a few echogenic foci. No varicocele. LEFT: Left testis measures 4.6 x 2.3 x 3.1 cm. No discrete testicular nodule or mass. A few scattered  parenchymal calcifications, a nonspecific finding. Normal Doppler vascularity. Normal epididymis. Small hydrocele without echogenic foci. No varicocele.     IMPRESSION: 1.  No discrete testicular nodule/mass or abnormal vascularity to suggest torsion on either side. A few small parenchymal calcifications in each testis, a nonspecific finding, unchanged. 2.  Moderate right and small left hydrocele, similar to prior.    XR Chest Port 1 View    Result Date: 3/8/2023  EXAM: XR CHEST PORT 1 VIEW LOCATION: [facility] DATE/TIME: 3/8/2023 8:31 PM INDICATION: post op fever; check for pulmonary source COMPARISON: 04/26/2016     IMPRESSION: Lungs are clear. Heart and pulmonary vascularity are normal. No signs of acute disease.    CT Abdomen Pelvis w/o & w Contrast    Result Date: 3/8/2023  EXAM: CT ABDOMEN PELVIS W/O and W CONTRAST LOCATION: [facility] DATE/TIME: 3/8/2023 11:01 AM INDICATION: Persistent right right-sided testicular, flank and abdominal pain. Vasectomy 02/10/2023 COMPARISON: Scrotal ultrasounds 3/8/2023 and 3/7/2023, chest radiograph 04/26/2016 and CTA chest 11/30/2009. TECHNIQUE: CT scan of the abdomen and pelvis was performed before and after injection of IV contrast. Multiplanar reformats were obtained. Dose reduction techniques were used. CONTRAST: 100ml Isovue 370.  FINDINGS: LOWER CHEST: Reticulonodular opacities throughout the basilar segments of the left lower lobe are unchanged from 2009 and consistent with benign postinflammatory scarring. Benign calcified granuloma right lower lobe. Benign calcified right distal paraesophageal posterior mediastinal lymph node. No pleural effusion. Heart size normal. No pericardial effusion. HEPATOBILIARY: Moderate diffuse hepatic steatosis and mild hepatic enlargement. Liver is otherwise normal. Gallbladder is contracted. No bile duct dilatation. PANCREAS: Normal. SPLEEN: Spleen size within normal limits. Benign  calcified granulomas spleen. ADRENAL GLANDS: Normal. KIDNEYS/BLADDER: Kidneys, ureters and bladder are normal. No urinary calculi or obstruction. BOWEL: Normal appendix. No bowel obstruction or inflammatory change. No free air. No free fluid. LYMPH NODES: Normal. VASCULATURE: Normal. PELVIC ORGANS: Vasectomy clips. Fluid in the right spermatic cord within which is a partially visualized varicocele and there is minimal contusion in the subcutaneous fat anterior to the right spermatic cord all of which is presumably related to recent vasectomy. Prostate and seminal vesicles and intrapelvic portion of the vas differentia unremarkable. MUSCULOSKELETAL: Normal.     IMPRESSION: 1.  Vasectomy clips. Fluid in the right spermatic cord within which is a partially visualized varicocele and there is minimal contusion in the subcutaneous fat anterior to the right spermatic cord all of which is presumably related to recent vasectomy.. 2.  Moderate diffuse hepatic steatosis and mild hepatic enlargement. 3.  Kidneys, ureters and bladder are normal.       Data reviewed today: I reviewed all medications, new labs and imaging results over the last 24 hours. I personally reviewed the abdominal CT image(s) showing right varicocele.  The patient's care was discussed with the Patient.

## 2023-03-09 NOTE — PLAN OF CARE
PRIMARY DIAGNOSIS: ACUTE PAIN  OUTPATIENT/OBSERVATION GOALS TO BE MET BEFORE DISCHARGE:  1. Pain Status: Improved-controlled with oral pain medications.    2. Return to near baseline physical activity: Yes    3. Cleared for discharge by consultants (if involved): No    Nurse to notify provider when observation goals have been met and patient is ready for discharge.    Goal Outcome Evaluation:      Plan of Care Reviewed With: patient    Overall Patient Progress: improving    Outcome Evaluation: IV antibiotics continued. Afebrile this morning. Pain well-controlled. Patient slept between care.    Problem: Plan of Care - These are the overarching goals to be used throughout the patient stay.    Goal: Plan of Care Review  Outcome: Progressing  Goal: Patient-Specific Goal (Individualized)  Outcome: Progressing  Flowsheets (Taken 3/9/2023 0257)  Individualized Care Needs: Ameya Gaffney, wife, with updates [phone]  Anxieties, Fears or Concerns: Fever  Patient/Family-Specific Goals (Include Timeframe): Keep pain level less than 3/10 throughout hospital stay.  Goal: Absence of Hospital-Acquired Illness or Injury  Outcome: Progressing  Intervention: Identify and Manage Fall Risk  Recent Flowsheet Documentation  Taken 3/8/2023 1948 by Janice Isaac, RN  Safety Promotion/Fall Prevention:    patient and family education    clutter free environment maintained  Intervention: Prevent Skin Injury  Taken 3/8/2023 1948 by Janice Isaac RN  Body Position: position changed independently  Goal: Optimal Comfort and Wellbeing  Outcome: Progressing  Intervention: Monitor Pain and Promote Comfort  Goal: Readiness for Transition of Care  Outcome: Progressing     Problem: Fever  Goal: Fever: Plan of Care  Outcome: Progressing     Problem: Pain Acute  Goal: Optimal Pain Control and Function  Outcome: Progressing  Intervention: Develop Pain Management Plan  Recent Flowsheet Documentation  Taken 3/8/2023 2010 by Janice Isaac, RN  Pain Management  Interventions:    medication (see MAR)    cold applied  Taken 3/8/2023 1948 by Janice Isaac, RN  Pain Management Interventions:    medication (see MAR)    environmental changes    repositioned  Intervention: Prevent or Manage Pain  Recent Flowsheet Documentation  Taken 3/8/2023 1948 by Janice Isaac, RN  Medication Review/Management:    medications reviewed

## 2023-03-09 NOTE — PROGRESS NOTES
PRIMARY DIAGNOSIS: ACUTE PAIN  OUTPATIENT/OBSERVATION GOALS TO BE MET BEFORE DISCHARGE:  1. Pain Status: Improved-controlled with oral pain medications. Note: Scrotal pain remains improved per patient; his pain-related complaint is headache rated 7/10 from the left temple to front.     2. Return to near baseline physical activity: Yes    3. Cleared for discharge by consultants (if involved): No     Nurse to notify provider when observation goals have been met and patient is ready for discharge.

## 2023-03-09 NOTE — PHARMACY-VANCOMYCIN DOSING SERVICE
Pharmacy Vancomycin Initial Note  Date of Service 2023  Patient's  1980  42 year old, male    Indication: Skin and Soft Tissue Infection    Current estimated CrCl = Estimated Creatinine Clearance: 114.1 mL/min (based on SCr of 1.09 mg/dL).    Creatinine for last 3 days  3/8/2023:  6:39 AM Creatinine 1.09 mg/dL    Recent Vancomycin Level(s) for last 3 days  No results found for requested labs within last 72 hours.      Vancomycin IV Administrations (past 72 hours)      No vancomycin orders with administrations in past 72 hours.                Nephrotoxins and other renal medications (From now, onward)    Start     Dose/Rate Route Frequency Ordered Stop    23 0243  piperacillin-tazobactam (ZOSYN) 3.375 g vial to attach to  mL bag        Note to Pharmacy: Extended infusion dosing to start 6 hours after initial infusion.   See Hyperspace for full Linked Orders Report.    3.375 g  over 240 Minutes Intravenous EVERY 8 HOURS 23 20423 2100  piperacillin-tazobactam (ZOSYN) 3.375 g vial to attach to  mL bag        See Hyperspace for full Linked Orders Report.    3.375 g  over 30 Minutes Intravenous ONCE 23 2044      23 1200  ketorolac (TORADOL) injection 15 mg         15 mg Intravenous EVERY 6 HOURS 23 1148 23 1159    23 1000  lisinopril (ZESTRIL) tablet 10 mg        Note to Pharmacy: PTA Sig:Take 1 tablet by mouth daily      10 mg Oral DAILY 23 0950      23 0000  naproxen (NAPROSYN) 250 MG tablet         250 mg Oral 2 TIMES DAILY WITH MEALS 23 1753            Contrast Orders - past 72 hours (72h ago, onward)    Start     Dose/Rate Route Frequency Stop    23 1100  iopamidol (ISOVUE-370) solution 100 mL         100 mL Intravenous ONCE 23 1049          [employer] Prediction of Planned Initial Vancomycin Regimen  Loading dose: 2,500 mg IV x 1  Regimen: 1250 mg IV every 12 hours.  Start time: 20:46 on  03/08/2023  Exposure target: AUC24 (range)400-600 mg/L.hr   AUC24,ss: 463 mg/L.hr  Probability of AUC24 > 400: 66 %  Ctrough,ss: 14.9 mg/L  Probability of Ctrough,ss > 20: 24 %  Probability of nephrotoxicity (Lodise KRISTYN 2009): 10 %          Plan:  1. Start vancomycin  2,500 mg IV once followed by 1,250 mg IV q12h.   2. Vancomycin monitoring method: AUC  3. Vancomycin therapeutic monitoring goal: 400-600 mg*h/L  4. Pharmacy will check vancomycin levels as appropriate in 1-3 Days.    5. Serum creatinine levels will be ordered daily for the first week of therapy and at least twice weekly for subsequent weeks.      Thank you for the consult.   Shara Porter, PharmD, BCPS

## 2023-03-09 NOTE — PROGRESS NOTES
[facility] MEDICINE PROGRESS NOTE      Summary:  42 year old male admitted 3/8/2023 for right testicular pain that occurred after  A coughing spell.  He is post op from a vasectomy on 2/10/2023.  He was treated on the   17th for persistent pain with doxycycline for 10 days.  He feels the pain resolved.  Yesterday  Prior to arrival he began with pain in his lower abdomen and right testicle after he coughed  A lot.  He was afebrile.       Problem list:    1.  Right testicular pain: mild persistent pain though improved with antibiotics  2.  POD 2/10/2023 vasectomy  3.  Fever: discharge was planned for yesterday though he spiked a 103 temp:   Discharge was cancelled; blood cultures pending; empiric antibiotics   Were changed to vancomycin and zosyn; ID consult pending  4.  HTN: home meds lisinopril, hydrochlorothiazide, metoprolol  5.  DVT prevention: ambulate      Violeta Colón MD  [facility]  [facility]  Phone: #[phone]  Securely message with the Vocera Web Console (learn more here)  Text page via GreenNote Paging/Directory     Interval History/Subjective:  Pain is 2/10 this morning    Physical Exam/Objective:  Temp:  [98.1  F (36.7  C)-103.1  F (39.5  C)] 98.1  F (36.7  C)  Pulse:  [] 72  Resp:  [14-16] 16  BP: (126-160)/(59-69) 126/59  SpO2:  [94 %-95 %] 95 %  Body mass index is 36.26 kg/m .    GENERAL:  Alert, appears comfortable, in no acute distress, appears stated age   HEAD:  Normocephalic, without obvious abnormality, atraumatic   EYES:  PERRL, conjunctiva/corneas clear, no scleral icterus, EOM's intact   NOSE: Nares normal, septum midline, mucosa normal, no drainage   THROAT: Lips, mucosa, and tongue normal; teeth and gums normal, mouth moist   NECK: Supple, symmetrical, trachea midline   BACK:   Symmetric, no curvature, ROM normal   LUNGS:   Clear to auscultation bilaterally, no rales, rhonchi, or wheezing, symmetric chest  rise on inhalation, respirations unlabored   CHEST WALL:  No tenderness or deformity   HEART:  Regular rate and rhythm, S1 and S2 normal, no murmur, rub, or gallop    ABDOMEN:   Soft, non-tender, bowel sounds active all four quadrants, no masses, no organomegaly, no rebound or guarding   EXTREMITIES: Extremities normal, atraumatic, no cyanosis or edema    SKIN: Dry to touch, no exanthems in the visualized areas   NEURO: Alert, oriented x3, moves all four extremities freely   PSYCH: Cooperative, behavior is appropriate      Data reviewed today: I personally reviewed all new medications, labs, imaging/diagnostics reports over the past 24 hours. Pertinent findings include:    Imaging:   Recent Results (from the past 24 hour(s))   CT Abdomen Pelvis w/o & w Contrast    Narrative    EXAM: CT ABDOMEN PELVIS W/O and W CONTRAST  LOCATION: [facility]  DATE/TIME: 3/8/2023 11:01 AM    INDICATION: Persistent right right-sided testicular, flank and abdominal pain. Vasectomy 02/10/2023  COMPARISON: Scrotal ultrasounds 3/8/2023 and 3/7/2023, chest radiograph 04/26/2016 and CTA chest 11/30/2009.  TECHNIQUE: CT scan of the abdomen and pelvis was performed before and after injection of IV contrast. Multiplanar reformats were obtained. Dose reduction techniques were used.  CONTRAST: 100ml Isovue 370.      FINDINGS:   LOWER CHEST: Reticulonodular opacities throughout the basilar segments of the left lower lobe are unchanged from 2009 and consistent with benign postinflammatory scarring. Benign calcified granuloma right lower lobe. Benign calcified right distal   paraesophageal posterior mediastinal lymph node. No pleural effusion. Heart size normal. No pericardial effusion.    HEPATOBILIARY: Moderate diffuse hepatic steatosis and mild hepatic enlargement. Liver is otherwise normal. Gallbladder is contracted. No bile duct dilatation.    PANCREAS: Normal.    SPLEEN: Spleen size within normal limits. Benign  calcified granulomas spleen.    ADRENAL GLANDS: Normal.    KIDNEYS/BLADDER: Kidneys, ureters and bladder are normal. No urinary calculi or obstruction.    BOWEL: Normal appendix. No bowel obstruction or inflammatory change. No free air. No free fluid.    LYMPH NODES: Normal.    VASCULATURE: Normal.    PELVIC ORGANS: Vasectomy clips. Fluid in the right spermatic cord within which is a partially visualized varicocele and there is minimal contusion in the subcutaneous fat anterior to the right spermatic cord all of which is presumably related to recent   vasectomy. Prostate and seminal vesicles and intrapelvic portion of the vas differentia unremarkable.    MUSCULOSKELETAL: Normal.      Impression    IMPRESSION:   1.  Vasectomy clips. Fluid in the right spermatic cord within which is a partially visualized varicocele and there is minimal contusion in the subcutaneous fat anterior to the right spermatic cord all of which is presumably related to recent vasectomy..  2.  Moderate diffuse hepatic steatosis and mild hepatic enlargement.   3.  Kidneys, ureters and bladder are normal.     XR Chest Port 1 View    Narrative    EXAM: XR CHEST PORT 1 VIEW  LOCATION: [facility]  DATE/TIME: 3/8/2023 8:31 PM    INDICATION: post op fever; check for pulmonary source  COMPARISON: 04/26/2016      Impression    IMPRESSION: Lungs are clear. Heart and pulmonary vascularity are normal. No signs of acute disease.       Labs:  Most Recent 3 BMP's:Recent Labs   Lab Test 03/09/23  0801 03/08/23  0639    135*   POTASSIUM 3.8 4.2   CHLORIDE 100 101   CO2 23 24   BUN 15 17   CR 1.22 1.09   ANIONGAP 14 10   MAYELA 9.7 9.5   * 243*       Medications:   Personally Reviewed.  Medications       aspirin  81 mg Oral Daily     citalopram  40 mg Oral Daily     hydrochlorothiazide  25 mg Oral Daily     ketorolac  15 mg Intravenous Q6H     lisinopril  10 mg Oral Daily     metoprolol tartrate  50 mg Oral BID      piperacillin-tazobactam  3.375 g Intravenous Q8H     simvastatin  40 mg Oral At Bedtime     vancomycin  1,250 mg Intravenous Q12H

## 2023-03-09 NOTE — SIGNIFICANT EVENT
Significant Event Note    Time of event: 8:24 PM March 8, 2023    Description of event:  Fever 103.1;   discharge plan canceled  He is status postvasectomy on 2/10/2023 he came in after coughing with testicular pain you are seen by urology treated with doxycycline has been plan to discharge today as the symptoms seem to improve but now he has a high fever no chills no evident evidence of sepsis    Plan:  #1 cancel discharge-nurse called  #2 chest x-ray tonight blood count BMP in a.m.; blood cultures already ordered x2; will get UA  #3 discontinue doxycycline for now start him on vancomycin and Zosyn until cultures come back  #4 we will ask ID to review situation tomorrow Thursday    Discussed with: bedside nurse Janice in emergency room    Jimmy Hopper MD

## 2023-03-10 VITALS
BODY MASS INDEX: 36.4 KG/M2 | OXYGEN SATURATION: 96 % | SYSTOLIC BLOOD PRESSURE: 149 MMHG | HEART RATE: 78 BPM | TEMPERATURE: 98.9 F | RESPIRATION RATE: 18 BRPM | DIASTOLIC BLOOD PRESSURE: 79 MMHG | WEIGHT: 260 LBS | HEIGHT: 71 IN

## 2023-03-10 LAB
ERYTHROCYTE [DISTWIDTH] IN BLOOD BY AUTOMATED COUNT: 14.4 % (ref 10–15)
HCT VFR BLD AUTO: 38.1 % (ref 40–53)
HGB BLD-MCNC: 12.3 G/DL (ref 13.3–17.7)
HOLD SPECIMEN: NORMAL
MCH RBC QN AUTO: 27.4 PG (ref 26.5–33)
MCHC RBC AUTO-ENTMCNC: 32.3 G/DL (ref 31.5–36.5)
MCV RBC AUTO: 85 FL (ref 78–100)
PLATELET # BLD AUTO: 181 10E3/UL (ref 150–450)
RBC # BLD AUTO: 4.49 10E6/UL (ref 4.4–5.9)
WBC # BLD AUTO: 5.4 10E3/UL (ref 4–11)

## 2023-03-10 PROCEDURE — 250N000011 HC RX IP 250 OP 636: Performed by: INTERNAL MEDICINE

## 2023-03-10 PROCEDURE — 258N000003 HC RX IP 258 OP 636: Performed by: INTERNAL MEDICINE

## 2023-03-10 PROCEDURE — 250N000011 HC RX IP 250 OP 636: Performed by: EMERGENCY MEDICINE

## 2023-03-10 PROCEDURE — 85027 COMPLETE CBC AUTOMATED: CPT | Performed by: EMERGENCY MEDICINE

## 2023-03-10 PROCEDURE — 99238 HOSP IP/OBS DSCHRG MGMT 30/<: CPT | Performed by: EMERGENCY MEDICINE

## 2023-03-10 PROCEDURE — 36415 COLL VENOUS BLD VENIPUNCTURE: CPT | Performed by: EMERGENCY MEDICINE

## 2023-03-10 PROCEDURE — 99232 SBSQ HOSP IP/OBS MODERATE 35: CPT | Performed by: INTERNAL MEDICINE

## 2023-03-10 PROCEDURE — 250N000013 HC RX MED GY IP 250 OP 250 PS 637: Performed by: EMERGENCY MEDICINE

## 2023-03-10 RX ORDER — DOXYCYCLINE 100 MG/1
100 CAPSULE ORAL EVERY 12 HOURS
Qty: 28 CAPSULE | Refills: 0 | Status: SHIPPED | OUTPATIENT
Start: 2023-03-10 | End: 2023-03-10

## 2023-03-10 RX ORDER — LEVOFLOXACIN 500 MG/1
500 TABLET, FILM COATED ORAL DAILY
Qty: 14 TABLET | Refills: 0 | Status: SHIPPED | OUTPATIENT
Start: 2023-03-10 | End: 2023-03-24

## 2023-03-10 RX ORDER — LEVOFLOXACIN 500 MG/1
500 TABLET, FILM COATED ORAL DAILY
Qty: 14 TABLET | Refills: 0 | Status: SHIPPED | OUTPATIENT
Start: 2023-03-10 | End: 2023-03-10

## 2023-03-10 RX ORDER — DOXYCYCLINE 100 MG/1
100 CAPSULE ORAL EVERY 12 HOURS SCHEDULED
Status: DISCONTINUED | OUTPATIENT
Start: 2023-03-10 | End: 2023-03-10 | Stop reason: HOSPADM

## 2023-03-10 RX ORDER — LEVOFLOXACIN 500 MG/1
500 TABLET, FILM COATED ORAL DAILY
Status: DISCONTINUED | OUTPATIENT
Start: 2023-03-10 | End: 2023-03-10 | Stop reason: HOSPADM

## 2023-03-10 RX ORDER — DOXYCYCLINE 100 MG/1
100 CAPSULE ORAL EVERY 12 HOURS
Qty: 28 CAPSULE | Refills: 0 | Status: SHIPPED | OUTPATIENT
Start: 2023-03-10 | End: 2024-05-29

## 2023-03-10 RX ADMIN — PIPERACILLIN AND TAZOBACTAM 3.38 G: 3; .375 INJECTION, POWDER, FOR SOLUTION INTRAVENOUS at 03:08

## 2023-03-10 RX ADMIN — METOPROLOL TARTRATE 50 MG: 25 TABLET, FILM COATED ORAL at 08:25

## 2023-03-10 RX ADMIN — KETOROLAC TROMETHAMINE 15 MG: 15 INJECTION, SOLUTION INTRAMUSCULAR; INTRAVENOUS at 06:35

## 2023-03-10 RX ADMIN — VANCOMYCIN HYDROCHLORIDE 1250 MG: 5 INJECTION, POWDER, LYOPHILIZED, FOR SOLUTION INTRAVENOUS at 10:34

## 2023-03-10 RX ADMIN — LISINOPRIL 10 MG: 10 TABLET ORAL at 08:25

## 2023-03-10 RX ADMIN — HYDROCHLOROTHIAZIDE 25 MG: 25 TABLET ORAL at 08:25

## 2023-03-10 RX ADMIN — CITALOPRAM HYDROBROMIDE 40 MG: 10 TABLET, FILM COATED ORAL at 08:25

## 2023-03-10 RX ADMIN — ASPIRIN 81 MG: 81 TABLET, COATED ORAL at 08:25

## 2023-03-10 ASSESSMENT — ACTIVITIES OF DAILY LIVING (ADL)
ADLS_ACUITY_SCORE: 35

## 2023-03-10 NOTE — PLAN OF CARE
Goal Outcome Evaluation:      Plan of Care Reviewed With: patient    Overall Patient Progress: improving    Outcome Evaluation: IV antibiotics infused. Afebrile all shift. Minimal pain on scheduled regimen.    Problem: Plan of Care - These are the overarching goals to be used throughout the patient stay.    Goal: Plan of Care Review  Outcome: Progressing  Goal: Patient-Specific Goal (Individualized)  Outcome: Progressing  Flowsheets (Taken 3/10/2023 0346)  Patient/Family-Specific Goals (Include Timeframe): Discharge today  Goal: Absence of Hospital-Acquired Illness or Injury  Outcome: Progressing  Intervention: Identify and Manage Fall Risk  Recent Flowsheet Documentation  Taken 3/9/2023 2110 by Janice Isaac RN  Safety Promotion/Fall Prevention:   nonskid shoes/slippers when out of bed   patient and family education  Intervention: Prevent Skin Injury  Recent Flowsheet Documentation  Taken 3/9/2023 2110 by Janice Isaac RN  Body Position: position changed independently  Goal: Optimal Comfort and Wellbeing  Outcome: Progressing  Intervention: Monitor Pain and Promote Comfort  Goal: Readiness for Transition of Care  Outcome: Progressing     Problem: Fever  Goal: Fever: Plan of Care  Outcome: Progressing     Problem: Pain Acute  Goal: Optimal Pain Control and Function  Outcome: Progressing  Intervention: Develop Pain Management Plan  Recent Flowsheet Documentation  Taken 3/9/2023 2110 by Janice Isaac RN  Pain Management Interventions:   distraction   quiet environment facilitated  Intervention: Prevent or Manage Pain  Recent Flowsheet Documentation  Taken 3/9/2023 2110 by Janice Isaac RN  Medication Review/Management: medications reviewed

## 2023-03-10 NOTE — PROGRESS NOTES
Place of Service:  [facility]     Reason for follow up: Right testicular pain    SUBJECTIVE:  Events: no acute events overnight    Patient has remained afebrile >24 hours. Last febrile on 3/8. The patient states his testicle has no pain when at rest. He denies fevers, chills, N/V, abdominal pain. While his testicle is painful on exam, he and his partner report that this is overall improved since he was first admitted.    OBJECTIVE:  PHYSICAL EXAM:  Temp: 98  F (36.7  C) Temp src: Oral BP: (!) 150/67 Pulse: 66   Resp: 20 SpO2: 96 % O2 Device: None (Room air)    General: NAD, alert, cooperative  Head: normocephalic, without abnormality / atraumatic  Abdomen: soft, non tender, non distended. no suprapubic fullness, no suprapubic tenderness. no CVA tenderness,   Genitourinary: right testicle/epididymis is indurated and swollen. Moderately tender to palpation. Left testicle is normal and non-tender on exam. No crepitus, erythema, purulent drainage.  Psychological: alert and oriented, answers questions appropriately    LABS:  Creatinine   Date Value Ref Range Status   03/09/2023 1.22 0.70 - 1.30 mg/dL Final   11/30/2009 1.28 (H) 0.66 - 1.25 mg/dL Final     Comment:     New IDMS-traceable calibration  beginning 5/1/08     WBC Count   Date Value Ref Range Status   03/09/2023 6.5 4.0 - 11.0 10e3/uL Final     Hemoglobin   Date Value Ref Range Status   03/09/2023 12.9 (L) 13.3 - 17.7 g/dL Final   ]  Platelet Count   Date Value Ref Range Status   03/09/2023 210 150 - 450 10e3/uL Final       UA:  UA RESULTS:  Recent Labs   Lab Test 03/09/23  0047   COLOR Colorless   APPEARANCE Clear   URINEGLC Negative   URINEBILI Negative   URINEKETONE Negative   SG 1.009   UBLD Negative   URINEPH 6.0   PROTEIN Negative   NITRITE Negative   LEUKEST Negative   RBCU <1   WBCU 1         Cultures:  UC 3/8/23: negative for growth    Blood culture 3/8 x2: NGTD    Lab Results: personally reviewed.     ASSESSMENT/PLAN:  Chencho Mane Jr. is  being seen by [facility] for right testicular pain.    - 42 year old male with history of vasectomy on 2/10 presents for right testicular pain and swelling. He was febrile >24 hours ago, last fever on 3/8.  - Patient is feeling overall improved. At rest without palpation, he reports no pain. On exam his right testicle/eididymis remain indurated and moderately tender to palpation but no new exam concerns. Overall he feels this pain has improved.  - Appreciate ID recommendations. Likely okay to switch to orals. Per Dr. Singh, thoughts on doxycycline and levofloxacin orals x 4 weeks? Once ID weighs in, likely okay to discharge on orals from urological perspective.     Dr. Singh was updated.    Vincenzo Joya PA-C  [facility]   [phone]

## 2023-03-10 NOTE — PLAN OF CARE
Goal Outcome Evaluation:       Pt discharged home with family assist. AVS reviewed, questions answered. Family to transport. Pt requesting to walk self out.

## 2023-03-10 NOTE — PROGRESS NOTES
Infectious Diseases Progress Note  [facility]    Date of visit: 03/10/2023       ASSESSMENT   42-year-old man who is admitted with right testicular pain.  ID is consulted regarding fevers and pain.    1. Status postvasectomy on 2/10 without complications  2. Right testicular pain.  Started on 2/17.  Improved with a course of doxycycline.  Worsening pain developed after episode of coughing 2 days ago.  Unremarkable scrotal ultrasound, but CT scan showing small varicocele and postvasectomy changes.  Denies STI risk factors.  GC/chlamydia urine probe negative.  3. Fever.  Only focal finding is right testicular pain.  Imaging does not show any obvious infection or occult abscess.  Recent URI symptoms with cough and congestion.  COVID screen negative.  Normal white count and procalcitonin.  Blood cultures negative to date.  No parotid swelling.    Principal Problem:    Testicular pain, right       PLAN   -recommend discharge with levofloxacin 500mg po daily and doxycycline 100mg po bid x 2 weeks with reassessment.  -no signs of deep seated infection, prostatitis, or abscess to indicate need for longer duration of antibiotics. Levofloxacin has broader coverage of gram negatives, while also having good coverage of atypicals such chlamydia, mycoplasma, and ureaplasma spp. However, doxycycline would be good for MRSA coverage in the setting of a possible post-op infection.   -urology f//up to ensure resolution of symptoms  -Okay to discharge from ID perspective.       Jose Preciado MD  [facility]  Direct messaging: [facility] Paging  On-Call ID provider: [phone], option: 9      ===========================================        SUBJECTIVE / INTERVAL HISTORY:     No events. No pain today. Able to walk and bear down without issues. Still has focal pain to touch. Wife at bedside.  Afebrile overnight.        Antibiotics   Doxycycline 3/8-  Zosyn 3/8-  Vancomycin  "3/8-    Previous:  None      Physical Exam     Temp:  [98  F (36.7  C)-98.9  F (37.2  C)] 98.9  F (37.2  C)  Pulse:  [66-81] 78  Resp:  [16-20] 18  BP: (132-167)/(59-91) 149/79  SpO2:  [94 %-97 %] 96 %    BP (!) 149/79 (BP Location: Left arm)   Pulse 78   Temp 98.9  F (37.2  C) (Oral)   Resp 18   Ht 1.803 m (5' 11\")   Wt 117.9 kg (260 lb)   SpO2 96%   BMI 36.26 kg/m      GENERAL:  well-developed, well-nourished, lying in bed in no acute distress.   HENT:  Head is normocephalic, atraumatic.   EYES:  Eyes have anicteric sclerae without conjunctival injection   : Normal external male genitalia without ulcers or lesions. Right testicle is tender with fullness near the epididymis   EXT: Extremities warm and without edema.  SKIN:  No acute rashes.   NEUROLOGIC:  Grossly nonfocal.      Cultures     3/8 blood cultures x2: No growth to date  3/8 urine culture: Negative        Pertinent Labs:     Recent Labs   Lab 03/10/23  0838 03/09/23  0801 03/08/23  0639   WBC 5.4 6.5 10.6   HGB 12.3* 12.9* 12.6*    210 214       Recent Labs   Lab 03/09/23 2057 03/09/23  0801 03/08/23  0639    137 135*   CO2 26 23 24   BUN 16 15 17   ALBUMIN  --   --  4.1   ALKPHOS  --   --  67   ALT  --   --  53*   AST  --   --  23       No results for input(s): CRP in the last 168 hours.    Invalid input(s): SEDRATE        Imaging:     US Testicular & Scrotum w Doppler Ltd    Result Date: 3/8/2023  EXAM: US TESTICULAR AND SCROTUM WITH DOPPLER LIMITED LOCATION: [facility] DATE/TIME: 3/8/2023 4:36 AM INDICATION: Right testicular pain COMPARISON: Ultrasound scrotum with Doppler evaluation the seventh 2023. TECHNIQUE: Ultrasound of scrotum with color flow and spectral Doppler with waveform analysis performed. FINDINGS: RIGHT: Right testis measures 4.2 x 2.7 x 2.8 cm. No discrete testicular nodule or mass. A few small parenchymal calcifications, a nonspecific finding. Normal Doppler vascularity. Normal " epididymis. Moderate hydrocele with a few echogenic foci. No varicocele. LEFT: Left testis measures 4.6 x 2.3 x 3.1 cm. No discrete testicular nodule or mass. A few scattered parenchymal calcifications, a nonspecific finding. Normal Doppler vascularity. Normal epididymis. Small hydrocele without echogenic foci. No varicocele.     IMPRESSION: 1.  No discrete testicular nodule/mass or abnormal vascularity to suggest torsion on either side. A few small parenchymal calcifications in each testis, a nonspecific finding, unchanged. 2.  Moderate right and small left hydrocele, similar to prior.    XR Chest Port 1 View    Result Date: 3/8/2023  EXAM: XR CHEST PORT 1 VIEW LOCATION: [facility] DATE/TIME: 3/8/2023 8:31 PM INDICATION: post op fever; check for pulmonary source COMPARISON: 04/26/2016     IMPRESSION: Lungs are clear. Heart and pulmonary vascularity are normal. No signs of acute disease.    CT Abdomen Pelvis w/o & w Contrast    Result Date: 3/8/2023  EXAM: CT ABDOMEN PELVIS W/O and W CONTRAST LOCATION: [facility] DATE/TIME: 3/8/2023 11:01 AM INDICATION: Persistent right right-sided testicular, flank and abdominal pain. Vasectomy 02/10/2023 COMPARISON: Scrotal ultrasounds 3/8/2023 and 3/7/2023, chest radiograph 04/26/2016 and CTA chest 11/30/2009. TECHNIQUE: CT scan of the abdomen and pelvis was performed before and after injection of IV contrast. Multiplanar reformats were obtained. Dose reduction techniques were used. CONTRAST: 100ml Isovue 370.  FINDINGS: LOWER CHEST: Reticulonodular opacities throughout the basilar segments of the left lower lobe are unchanged from 2009 and consistent with benign postinflammatory scarring. Benign calcified granuloma right lower lobe. Benign calcified right distal paraesophageal posterior mediastinal lymph node. No pleural effusion. Heart size normal. No pericardial effusion. HEPATOBILIARY: Moderate diffuse hepatic steatosis and  mild hepatic enlargement. Liver is otherwise normal. Gallbladder is contracted. No bile duct dilatation. PANCREAS: Normal. SPLEEN: Spleen size within normal limits. Benign calcified granulomas spleen. ADRENAL GLANDS: Normal. KIDNEYS/BLADDER: Kidneys, ureters and bladder are normal. No urinary calculi or obstruction. BOWEL: Normal appendix. No bowel obstruction or inflammatory change. No free air. No free fluid. LYMPH NODES: Normal. VASCULATURE: Normal. PELVIC ORGANS: Vasectomy clips. Fluid in the right spermatic cord within which is a partially visualized varicocele and there is minimal contusion in the subcutaneous fat anterior to the right spermatic cord all of which is presumably related to recent vasectomy. Prostate and seminal vesicles and intrapelvic portion of the vas differentia unremarkable. MUSCULOSKELETAL: Normal.     IMPRESSION: 1.  Vasectomy clips. Fluid in the right spermatic cord within which is a partially visualized varicocele and there is minimal contusion in the subcutaneous fat anterior to the right spermatic cord all of which is presumably related to recent vasectomy.. 2.  Moderate diffuse hepatic steatosis and mild hepatic enlargement. 3.  Kidneys, ureters and bladder are normal.         Data reviewed today: I reviewed all medications, new labs and imaging results over the last 24 hours. I personally reviewed no images or EKG's today.  The patient's care was discussed with the Bedside Nurse, Patient and Patient's Family.

## 2023-03-10 NOTE — DISCHARGE SUMMARY
[facility] MEDICINE  DISCHARGE SUMMARY     Primary Care Physician: Kat Serrato  Admission Date: 3/8/2023   Discharge Provider: Violeta Colón MD Discharge Date: 3/10/2023   Diet:   Active Diet and Nourishment Order   Procedures     Regular Diet Adult     Diet       Code Status: Full Code   Activity: as tolerated        Condition at Discharge: Stable     REASON FOR PRESENTATION(See Admission Note for Details)    right testicular pain    PRINCIPAL & ACTIVE DISCHARGE DIAGNOSES     1.  Right testicular pain  2.  Post operative state, vasectomy done on 2/10/2023  3.  History of HTN    PENDING LABS     Unresulted Labs Ordered in the Past 30 Days of this Admission     Date and Time Order Name Status Description    3/9/2023  8:01 PM Blood Culture Hand, Right Preliminary     3/9/2023  8:01 PM Blood Culture Hand, Left Preliminary     3/9/2023  2:04 PM Urogenital Ureaplasma and Mycoplasma Species by PCR In process     3/8/2023  7:58 PM Blood Culture Hand, Left Preliminary     3/8/2023  7:58 PM Blood Culture Hand, Right Preliminary             PROCEDURES ( this hospitalization only)          RECOMMENDATIONS TO OUTPATIENT PROVIDER FOR F/U VISIT     Follow-up Appointments     Follow-up and recommended labs and tests      [facility] will call you to arrange outpatient follow up with Dr. Singh   on Friday, 3/10/23. You may call to confirm appointment as needed.   [facility], [phone]         Follow-up and recommended labs and tests       Urology will call you tomorrow; follow up with their  Clinic on friday                 DISPOSITION     Home    SUMMARY OF HOSPITAL COURSE:      42 year old male into [facility] on 3/8/2023 after presenting with an acute onset of right  Testicular pain that began after a coughing spell.  PMHx includes outpatient vasectomy with  [facility] on 2/10/2023.  Post op he had mild right testicular pain. He was treated with a 10  day  Course of doxycycline that began on 2/17/2023.  He felt improved.  On arrival he was afebrile  With mild pain.  ER work up showed an afebrile patient who was hemodynamically stable.  The  Testicle was not red.  Ultrasound of the area showed good flow, no torsion though he had a  Small to moderate sized hydrocele.  (similar to his most recent ultrasound).  Due to his  Recent procedure and pain he was admitted for urology consultation and iv antibiotics.      Over 24 hours he felt improved.  CT abdomen/pelvis without contrast was negative for masses,  Stones or abscesses.  He was put on doxycline.  He was seen by the urology team.  He felt  Improved.  Discharge was planned on the 9th though he spiked a 103 fever.  Blood cultures were  Drawn.  The discharge was cancelled.  The ID team consulted.  He was put on zosyn and  Vancomycin.  The fever did not recur.  He was not septic.  On the day of the 10th he was  Seen by the ID team again.  He is discharged with levaquin and doxycycline for 2 weeks.  He  Will be seen contacted by the urology clinic in 2 weeks.  He is stable and improved for   Discharge.     Discharge Medications with Med changes:     Current Discharge Medication List      START taking these medications    Details   doxycycline hyclate (VIBRAMYCIN) 100 MG capsule Take 1 capsule (100 mg) by mouth every 12 hours  Qty: 28 capsule, Refills: 0    Associated Diagnoses: Testicular pain, right      !! levofloxacin (LEVAQUIN) 500 MG tablet Take 1 tablet (500 mg) by mouth daily for 14 days  Qty: 14 tablet, Refills: 0    Associated Diagnoses: Testicular pain, right      !! levofloxacin (LEVAQUIN) 500 MG tablet Take 1 tablet (500 mg) by mouth daily for 14 days  Qty: 14 tablet, Refills: 0    Associated Diagnoses: Testicular pain, right      naproxen (NAPROSYN) 250 MG tablet Take 1 tablet (250 mg) by mouth 2 times daily (with meals)  Qty: 40 tablet, Refills: 0    Associated Diagnoses: Testicular pain, right       !!  - Potential duplicate medications found. Please discuss with provider.      CONTINUE these medications which have NOT CHANGED    Details   aspirin 81 MG EC tablet Take 81 mg by mouth daily      citalopram (CELEXA) 40 MG tablet Take 1 tablet by mouth daily      hydrochlorothiazide (HYDRODIURIL) 25 MG tablet Take 25 mg by mouth daily      lisinopril (ZESTRIL) 10 MG tablet Take 1 tablet by mouth daily      metoprolol tartrate (LOPRESSOR) 50 MG tablet Take 1 tablet by mouth 2 times daily      multivitamin w/minerals (THERA-VIT-M) tablet Take 1 tablet by mouth daily      simvastatin (ZOCOR) 40 MG tablet Take 40 mg by mouth daily             Consults   Urology     UROLOGY IP CONSULT  INFECTIOUS DISEASES IP CONSULT  PHARMACY TO DOSE VANCO    Immunizations given this encounter     Most Recent Immunizations   Administered Date(s) Administered     COVID-19 Vaccine 18+ (Moderna) 11/09/2021          SIGNIFICANT IMAGING FINDINGS     Results for orders placed or performed during the hospital encounter of 03/08/23   US Testicular & Scrotum w Doppler Ltd    Impression    IMPRESSION:  1.  No discrete testicular nodule/mass or abnormal vascularity to suggest torsion on either side. A few small parenchymal calcifications in each testis, a nonspecific finding, unchanged.    2.  Moderate right and small left hydrocele, similar to prior.   CT Abdomen Pelvis w/o & w Contrast    Impression    IMPRESSION:   1.  Vasectomy clips. Fluid in the right spermatic cord within which is a partially visualized varicocele and there is minimal contusion in the subcutaneous fat anterior to the right spermatic cord all of which is presumably related to recent vasectomy..  2.  Moderate diffuse hepatic steatosis and mild hepatic enlargement.   3.  Kidneys, ureters and bladder are normal.     XR Chest Port 1 View    Impression    IMPRESSION: Lungs are clear. Heart and pulmonary vascularity are normal. No signs of acute disease.       SIGNIFICANT LABORATORY  FINDINGS     Most Recent 3 BMP's:Recent Labs   Lab Test 03/09/23 2057 03/09/23  0801 03/08/23  0639    137 135*   POTASSIUM 3.8 3.8 4.2   CHLORIDE 101 100 101   CO2 26 23 24   BUN 16 15 17   CR 1.22 1.22 1.09   ANIONGAP 9 14 10   MAYELA 9.5 9.7 9.5   * 219* 243*           Discharge Orders        Follow-up and recommended labs and tests    [facility] will call you to arrange outpatient follow up with Dr. Singh on Friday, 3/10/23. You may call to confirm appointment as needed. [facility], [phone]     Reason for your hospital stay    Testicular pain, right     Follow-up and recommended labs and tests     Urology will call you tomorrow; follow up with their  Clinic on friday     Activity    Scrotal support; no heavy lifting until cleared by urology   (Less than 10#)     Diet    Mediterranean heart healthy       Examination   Physical Exam   Temp:  [98  F (36.7  C)-98.9  F (37.2  C)] 98.9  F (37.2  C)  Pulse:  [66-80] 78  Resp:  [16-20] 18  BP: (135-167)/(59-91) 149/79  SpO2:  [94 %-96 %] 96 %  Wt Readings from Last 1 Encounters:   03/08/23 117.9 kg (260 lb)       General Appearance: Alert, oriented, denies pain, afebrile  Respiratory: clear bilaterally  Cardiovascular: regular  GI: soft, NDNT, +BS  Skin: no rashe      Please see EMR for more detailed significant labs, imaging, consultant notes etc.    IVioleta MD, personally saw the patient today and spent less  than 30 minutes discharging this patient.    Violeta Colón MD  [facility]    CC:Kat Serrato

## 2023-03-13 LAB
M GENITALIUM DNA SPEC QL NAA+PROBE: NOT DETECTED
M HOMINIS DNA SPEC QL NAA+PROBE: NOT DETECTED
U PARVUM DNA SPEC QL NAA+PROBE: NOT DETECTED
U UREALYTICUM DNA SPEC QL NAA+PROBE: NOT DETECTED

## 2023-03-14 LAB
BACTERIA BLD CULT: NO GROWTH
BACTERIA BLD CULT: NO GROWTH

## 2023-03-15 LAB
BACTERIA BLD CULT: NO GROWTH
BACTERIA BLD CULT: NO GROWTH

## 2023-03-21 ENCOUNTER — OFFICE VISIT (OUTPATIENT)
Dept: FAMILY MEDICINE | Facility: CLINIC | Age: 43
End: 2023-03-21
Payer: COMMERCIAL

## 2023-03-21 VITALS
HEIGHT: 71 IN | BODY MASS INDEX: 37.32 KG/M2 | OXYGEN SATURATION: 97 % | HEART RATE: 82 BPM | SYSTOLIC BLOOD PRESSURE: 114 MMHG | DIASTOLIC BLOOD PRESSURE: 68 MMHG | TEMPERATURE: 98.2 F | WEIGHT: 266.6 LBS

## 2023-03-21 DIAGNOSIS — Z12.11 COLON CANCER SCREENING: ICD-10-CM

## 2023-03-21 DIAGNOSIS — E78.2 MIXED HYPERLIPIDEMIA: ICD-10-CM

## 2023-03-21 DIAGNOSIS — Z00.00 PHYSICAL EXAM, ANNUAL: Primary | ICD-10-CM

## 2023-03-21 DIAGNOSIS — I10 ESSENTIAL HYPERTENSION: ICD-10-CM

## 2023-03-21 DIAGNOSIS — F41.9 ANXIETY: ICD-10-CM

## 2023-03-21 DIAGNOSIS — K76.0 FATTY LIVER: ICD-10-CM

## 2023-03-21 LAB
ALBUMIN SERPL BCG-MCNC: 4.8 G/DL (ref 3.5–5.2)
ALP SERPL-CCNC: 83 U/L (ref 40–129)
ALT SERPL W P-5'-P-CCNC: 59 U/L (ref 10–50)
ANION GAP SERPL CALCULATED.3IONS-SCNC: 15 MMOL/L (ref 7–15)
AST SERPL W P-5'-P-CCNC: 36 U/L (ref 10–50)
BILIRUB SERPL-MCNC: 0.5 MG/DL
BUN SERPL-MCNC: 16.6 MG/DL (ref 6–20)
CALCIUM SERPL-MCNC: 10.3 MG/DL (ref 8.6–10)
CHLORIDE SERPL-SCNC: 97 MMOL/L (ref 98–107)
CHOLEST SERPL-MCNC: 159 MG/DL
CREAT SERPL-MCNC: 0.99 MG/DL (ref 0.67–1.17)
DEPRECATED HCO3 PLAS-SCNC: 25 MMOL/L (ref 22–29)
GFR SERPL CREATININE-BSD FRML MDRD: >90 ML/MIN/1.73M2
GLUCOSE SERPL-MCNC: 129 MG/DL (ref 70–99)
HBA1C MFR BLD: 7.1 % (ref 0–5.6)
HDLC SERPL-MCNC: 31 MG/DL
LDLC SERPL CALC-MCNC: 69 MG/DL
NONHDLC SERPL-MCNC: 128 MG/DL
POTASSIUM SERPL-SCNC: 4.1 MMOL/L (ref 3.4–5.3)
PROT SERPL-MCNC: 8.3 G/DL (ref 6.4–8.3)
SODIUM SERPL-SCNC: 137 MMOL/L (ref 136–145)
TRIGL SERPL-MCNC: 295 MG/DL

## 2023-03-21 PROCEDURE — 90471 IMMUNIZATION ADMIN: CPT | Performed by: PHYSICIAN ASSISTANT

## 2023-03-21 PROCEDURE — 80061 LIPID PANEL: CPT | Performed by: PHYSICIAN ASSISTANT

## 2023-03-21 PROCEDURE — 99214 OFFICE O/P EST MOD 30 MIN: CPT | Mod: 25 | Performed by: PHYSICIAN ASSISTANT

## 2023-03-21 PROCEDURE — 80053 COMPREHEN METABOLIC PANEL: CPT | Performed by: PHYSICIAN ASSISTANT

## 2023-03-21 PROCEDURE — 99396 PREV VISIT EST AGE 40-64: CPT | Mod: 25 | Performed by: PHYSICIAN ASSISTANT

## 2023-03-21 PROCEDURE — 36415 COLL VENOUS BLD VENIPUNCTURE: CPT | Performed by: PHYSICIAN ASSISTANT

## 2023-03-21 PROCEDURE — 83036 HEMOGLOBIN GLYCOSYLATED A1C: CPT | Performed by: PHYSICIAN ASSISTANT

## 2023-03-21 PROCEDURE — 90715 TDAP VACCINE 7 YRS/> IM: CPT | Performed by: PHYSICIAN ASSISTANT

## 2023-03-21 RX ORDER — METOPROLOL TARTRATE 50 MG
50 TABLET ORAL 2 TIMES DAILY
Qty: 180 TABLET | Refills: 3 | Status: SHIPPED | OUTPATIENT
Start: 2023-03-21 | End: 2024-04-16

## 2023-03-21 RX ORDER — HYDROCHLOROTHIAZIDE 25 MG/1
25 TABLET ORAL DAILY
Qty: 90 TABLET | Refills: 3 | Status: SHIPPED | OUTPATIENT
Start: 2023-03-21 | End: 2024-06-13

## 2023-03-21 RX ORDER — SIMVASTATIN 40 MG
40 TABLET ORAL DAILY
Qty: 90 TABLET | Refills: 3 | Status: SHIPPED | OUTPATIENT
Start: 2023-03-21 | End: 2024-06-13

## 2023-03-21 RX ORDER — CITALOPRAM HYDROBROMIDE 40 MG/1
40 TABLET ORAL DAILY
Qty: 90 TABLET | Refills: 3 | Status: SHIPPED | OUTPATIENT
Start: 2023-03-21 | End: 2024-06-13

## 2023-03-21 RX ORDER — ASPIRIN 81 MG/1
81 TABLET ORAL DAILY
Qty: 100 TABLET | Refills: 3 | Status: SHIPPED | OUTPATIENT
Start: 2023-03-21 | End: 2024-03-19

## 2023-03-21 RX ORDER — LISINOPRIL 10 MG/1
10 TABLET ORAL DAILY
Qty: 90 TABLET | Refills: 3 | Status: SHIPPED | OUTPATIENT
Start: 2023-03-21 | End: 2024-06-13

## 2023-03-21 NOTE — PROGRESS NOTES
"  Assessment & Plan     Physical exam, annual  - Comprehensive metabolic panel (BMP + Alb, Alk Phos, ALT, AST, Total. Bili, TP); Future  - aspirin 81 MG EC tablet; Take 1 tablet (81 mg) by mouth daily  - simvastatin (ZOCOR) 40 MG tablet; Take 1 tablet (40 mg) by mouth daily  - Hemoglobin A1c; Future  - Comprehensive metabolic panel (BMP + Alb, Alk Phos, ALT, AST, Total. Bili, TP)  - Hemoglobin A1c    Essential hypertension  -managed well  -no change in meds  -refills sent  - hydrochlorothiazide (HYDRODIURIL) 25 MG tablet; Take 1 tablet (25 mg) by mouth daily  - lisinopril (ZESTRIL) 10 MG tablet; Take 1 tablet (10 mg) by mouth daily  - metoprolol tartrate (LOPRESSOR) 50 MG tablet; Take 1 tablet (50 mg) by mouth 2 times daily    Anxiety  -doing well.  Happy with citalopram.  Refills sent  - citalopram (CELEXA) 40 MG tablet; Take 1 tablet (40 mg) by mouth daily    Mixed hyperlipidemia  -on statin, will reheck    Colon cancer screening  -maternal aunt with colon cancer, would like early screening.    - Adult GI  Referral - Procedure Only; Future    Fatty liver  -will check LFTs today       BMI:   Estimated body mass index is 37.32 kg/m  as calculated from the following:    Height as of this encounter: 1.8 m (5' 10.87\").    Weight as of this encounter: 120.9 kg (266 lb 9.6 oz).       Return in about 1 year (around 3/21/2024).    aKt Serrato PA-C  [facility]    Roselia Paiz is a 42 year old, presenting for the following health issues:  RECHECK and Establish Care (Former patient)    -pt well known from my previous clinic, here to establish care     History of Present Illness       Hyperlipidemia:  He presents for follow up of hyperlipidemia.  He is taking medication to lower cholesterol. He is not having myalgia or other side effects to statin medications.    Hypertension: He presents for follow up of hypertension.  He does not check blood pressure  regularly outside of " "the clinic. Outpatient blood pressures have not been over 140/90. He does not follow a low salt diet.     Vascular Disease:  He presents for follow up of vascular disease.  He never takes nitroglycerin. He takes daily aspirin.    He eats 4 or more servings of fruits and vegetables daily.He consumes 2 sweetened beverage(s) daily.He exercises with enough effort to increase his heart rate 30 to 60 minutes per day.  He exercises with enough effort to increase his heart rate 5 days per week.   He is taking medications regularly.     Anxiety is managed well with citalopram    Hx of elevated LFTs.  Liver US showed fatty infiltration.  He has tried to lose weight in the past.  Will reheck today      Review of Systems   Constitutional, HEENT, cardiovascular, pulmonary, gi and gu systems are negative, except as otherwise noted.      Objective    /68 (BP Location: Right arm, Patient Position: Sitting, Cuff Size: Adult Large)   Pulse 82   Temp 98.2  F (36.8  C) (Temporal)   Ht 1.8 m (5' 10.87\")   Wt 120.9 kg (266 lb 9.6 oz)   SpO2 97%   BMI 37.32 kg/m    Body mass index is 37.32 kg/m .  Physical Exam   GENERAL: healthy, alert and no distress  EYES: Eyes grossly normal to inspection, PERRL and conjunctivae and sclerae normal  HENT: ear canals and TM's normal, nose and mouth without ulcers or lesions  NECK: no adenopathy, no asymmetry, masses, or scars and thyroid normal to palpation  RESP: lungs clear to auscultation - no rales, rhonchi or wheezes  CV: regular rate and rhythm, normal S1 S2, no S3 or S4, no murmur, click or rub, no peripheral edema and peripheral pulses strong  ABDOMEN: soft, nontender, no hepatosplenomegaly, no masses and bowel sounds normal  MS: no gross musculoskeletal defects noted, no edema  SKIN: no suspicious lesions or rashes  NEURO: Normal strength and tone, mentation intact and speech normal  PSYCH: mentation appears normal, affect normal/bright                    "

## 2023-03-22 ENCOUNTER — MYC MEDICAL ADVICE (OUTPATIENT)
Dept: FAMILY MEDICINE | Facility: CLINIC | Age: 43
End: 2023-03-22
Payer: COMMERCIAL

## 2023-05-14 ENCOUNTER — HEALTH MAINTENANCE LETTER (OUTPATIENT)
Age: 43
End: 2023-05-14

## 2023-06-14 ENCOUNTER — TELEPHONE (OUTPATIENT)
Dept: GASTROENTEROLOGY | Facility: CLINIC | Age: 43
End: 2023-06-14
Payer: COMMERCIAL

## 2023-06-14 NOTE — TELEPHONE ENCOUNTER
"Endoscopy Scheduling Screen    Have you had a positive Covid test in the last 14 days?  No    Are you active on MyChart?   Yes    What insurance is in the chart?  Other:      Ordering/Referring Provider: ALOK MACHUCA   (If ordering provider performs procedure, schedule with ordering provider unless otherwise instructed. )    BMI: Estimated body mass index is 37.32 kg/m  as calculated from the following:    Height as of 3/21/23: 1.8 m (5' 10.87\").    Weight as of 3/21/23: 120.9 kg (266 lb 9.6 oz).     Sedation Ordered  moderate sedation.   If patient BMI > 50 do not schedule in ASC.    Are you taking any prescription medications for pain?   No    Are you taking methadone or Suboxone?  No    Do you have a history of malignant hyperthermia or adverse reaction to anesthesia?  No    (Females) Are you currently pregnant?        Have you been diagnosed or told you have pulmonary hypertension?   No    Do you have an LVAD?  No    Have you been told you have moderate to severe sleep apnea?  YES RN REVIEW    Have you been told you have COPD, asthma, or any other lung disease?  No    Do you have any heart conditions?  No     Have you ever had or are you awaiting a heart or lung transplant?   No    Have you had a stroke or transient ischemic attack (TIA aka \"mini stroke\" in the last 6 months?   No    Have you been diagnosed with or been told you have cirrhosis of the liver?   No    Are you currently on dialysis?   No    Do you need assistance transferring?   No    BMI: Estimated body mass index is 37.32 kg/m  as calculated from the following:    Height as of 3/21/23: 1.8 m (5' 10.87\").    Weight as of 3/21/23: 120.9 kg (266 lb 9.6 oz).     Is patients BMI > 40 and scheduling location [facility]?  No    Do you take the medication Phentermine, Ozempic or Wegovy?  No    Do you take the medication Naltrexone?  No    Do you take blood thinners?  No    Preferred Pharmacy:    [id number] - [address] [address]  [address]  [facility] [address]  Phone: [phone] Fax: [phone]      Prep   Are you currently on dialysis or do you have chronic kidney disease?  No (standard prep)    Do you have a diagnosis of diabetes?  No    Do you have a diagnosis of cystic fibrosis (CF)?  No    On a regular basis do you go 3 -5 days between bowel movements?  No    BMI > 40?  No    Final Scheduling Details   Colonoscopy prep sent?  MiraLAX (No Mag Citrate)    Procedure scheduled  COLON    Surgeon:  NICOLE     Date of procedure:  9/22     Schedule PAC:   No    Location  [facility]    Sedation   Moderate Sedation    Patient Reminders:    You will receive a call from a Nurse to review instructions and health history.  This assessment must be completed prior to your procedure.  Failure to complete the Nurse assessment may result in the procedure being cancelled.       On the day of your procedure, please designate an adult(s) who can drive you home stay with you for the next 24 hours. The medicines used in the exam will make you sleepy. You will not be able to drive.       You cannot take public transportation, ride share services, or non-medical taxi service without a responsible caregiver.  Medical transport services are allowed with the requirement that a responsible caregiver will receive you at your destination.  We require that drivers and caregivers are confirmed prior to your procedure.

## 2023-06-14 NOTE — TELEPHONE ENCOUNTER
"Pre Assessment RN Review    Focused Assessments      INOCENCIO Hx  Estimated body mass index is 37.32 kg/m  as calculated from the following:    Height as of 3/21/23: 1.8 m (5' 10.87\").    Weight as of 3/21/23: 120.9 kg (266 lb 9.6 oz).     Patient has reported / documented history INOCENCIO and reports he does use a a device for sleep.     Device: CPAP    Severity Assessment    Stop-Bang:   Complete risk assessment  Link to STOP-Bang Flowsheet :[id number]}  (CTRL+F11 to refresh)           View : No data to display.                                            Low Risk   (1 - 2) Intermediate Risk   (3 - 4)   AND NONE of the following:   - Male    - BMI > 35   - Neck Circ > 16\" Intermediate Risk   (3 - 4)   AND ANY of the following:   - Male    - BMI > 35   - Neck Circ > 16\" High Risk   (5+)   No Scheduling Restrictions No Scheduling Restrictions Hospital Only Hospital Only     Chart Review  No sleep study available for review.      Scheduling Status & Recommendations    Location Type: Hospital  Prep: Standard Golytely    STOP- BANG Sleep Apnea Questionnaire    STOP  1. Do you snore loudly (louder than talking or loud enough to be heard through closed doors)? Yes  2. Do you often feel tired, fatigued, or sleepy during daytime? No  3. Has anyone observed you stop breathing during your sleep? Yes  4. Do you have or are you being treated for high blood pressure? Yes    BANG  1. BMI more than 35/kg/m2? Yes  2. Age over 50 years old? No  3. Neck circumference >16 inches (40cm)?  Yes  4. Gender: Male? Yes    Total score: 6- High risk of INOCENCIO        "

## 2023-06-30 ENCOUNTER — TELEPHONE (OUTPATIENT)
Dept: FAMILY MEDICINE | Facility: CLINIC | Age: 43
End: 2023-06-30

## 2023-06-30 ENCOUNTER — OFFICE VISIT (OUTPATIENT)
Dept: FAMILY MEDICINE | Facility: CLINIC | Age: 43
End: 2023-06-30
Payer: COMMERCIAL

## 2023-06-30 VITALS
HEART RATE: 75 BPM | SYSTOLIC BLOOD PRESSURE: 129 MMHG | DIASTOLIC BLOOD PRESSURE: 73 MMHG | TEMPERATURE: 97.9 F | OXYGEN SATURATION: 96 % | WEIGHT: 272 LBS | BODY MASS INDEX: 38.08 KG/M2 | RESPIRATION RATE: 18 BRPM

## 2023-06-30 DIAGNOSIS — K76.0 FATTY LIVER: ICD-10-CM

## 2023-06-30 DIAGNOSIS — E66.01 CLASS 2 SEVERE OBESITY DUE TO EXCESS CALORIES WITH SERIOUS COMORBIDITY AND BODY MASS INDEX (BMI) OF 38.0 TO 38.9 IN ADULT (H): ICD-10-CM

## 2023-06-30 DIAGNOSIS — E78.2 MIXED HYPERLIPIDEMIA: ICD-10-CM

## 2023-06-30 DIAGNOSIS — I10 PRIMARY HYPERTENSION: ICD-10-CM

## 2023-06-30 DIAGNOSIS — N52.9 ERECTILE DYSFUNCTION, UNSPECIFIED ERECTILE DYSFUNCTION TYPE: ICD-10-CM

## 2023-06-30 DIAGNOSIS — E66.812 CLASS 2 SEVERE OBESITY DUE TO EXCESS CALORIES WITH SERIOUS COMORBIDITY AND BODY MASS INDEX (BMI) OF 38.0 TO 38.9 IN ADULT (H): ICD-10-CM

## 2023-06-30 DIAGNOSIS — E11.9 TYPE 2 DIABETES MELLITUS WITHOUT COMPLICATION, WITHOUT LONG-TERM CURRENT USE OF INSULIN (H): Primary | ICD-10-CM

## 2023-06-30 PROBLEM — R07.89 ATYPICAL CHEST PAIN: Status: ACTIVE | Noted: 2021-11-02

## 2023-06-30 LAB
CREAT UR-MCNC: 280 MG/DL
HBA1C MFR BLD: 7.3 % (ref 0–5.6)
MICROALBUMIN UR-MCNC: <12 MG/L
MICROALBUMIN/CREAT UR: NORMAL MG/G{CREAT}

## 2023-06-30 PROCEDURE — 83036 HEMOGLOBIN GLYCOSYLATED A1C: CPT | Performed by: PHYSICIAN ASSISTANT

## 2023-06-30 PROCEDURE — 82043 UR ALBUMIN QUANTITATIVE: CPT | Performed by: PHYSICIAN ASSISTANT

## 2023-06-30 PROCEDURE — 99214 OFFICE O/P EST MOD 30 MIN: CPT | Performed by: PHYSICIAN ASSISTANT

## 2023-06-30 PROCEDURE — 36415 COLL VENOUS BLD VENIPUNCTURE: CPT | Performed by: PHYSICIAN ASSISTANT

## 2023-06-30 PROCEDURE — 82570 ASSAY OF URINE CREATININE: CPT | Performed by: PHYSICIAN ASSISTANT

## 2023-06-30 RX ORDER — SILDENAFIL 100 MG/1
100 TABLET, FILM COATED ORAL DAILY PRN
Qty: 10 TABLET | Refills: 1 | Status: SHIPPED | OUTPATIENT
Start: 2023-06-30 | End: 2023-07-24

## 2023-06-30 NOTE — TELEPHONE ENCOUNTER
PRIOR AUTHORIZATION DENIED    Medication: SEMAGLUTIDE(0.25 OR 0.5MG/DOS) 2 [facility]  Insurance Company: [employer] - Phone [phone] Fax [phone]  Denial Date: 6/30/2023  Denial Rational:     Appeal Information:     Patient Notified: No

## 2023-06-30 NOTE — LETTER
To Whom it May Concern,     I am the primary care provider for Chencho Mane.  Mr Mane has a history of diet controlled Type 2 Diabetes Mellitus for the past 5 years.  He is no longer able to manage this alone with diet.  He has not tolerated Metformin in the past.      As you know, using a GLP1 agonist is now considered an option for first line treatment in Type 2 Diabetes, especially for those who do not tolerate Metformin.  Please approve semaglutide for this patient in order to allow him to continue to manage his blood sugars in the appropriate range,      Regards,    Kat Serrato PA-C on 7/4/2023 at 11:48 AM

## 2023-06-30 NOTE — PROGRESS NOTES
"  Assessment & Plan     Type 2 diabetes mellitus without complication, without long-term current use of insulin (H)  -did not tolerate Metformin in the past  -A1C above goal with diet.  Will trial GLP1 agonist for blood sugars and weight loss  -Side effects of medication(s) discussed as well as risks/benefits of taking    -f/u in 3 months to recheck A1C  - Hemoglobin A1c; Future  - Albumin Random Urine Quantitative with Creat Ratio; Future  - Hemoglobin A1c  - Albumin Random Urine Quantitative with Creat Ratio  - semaglutide (OZEMPIC) 2 MG/3ML pen; Inject 0.25 mg Subcutaneous every 7 days For month 1  - semaglutide (OZEMPIC) 2 MG/3ML pen; Inject 0.5 mg Subcutaneous every 7 days During month 2  - Semaglutide, 1 MG/DOSE, (OZEMPIC) 4 MG/3ML pen; Inject 1 mg Subcutaneous every 7 days For month 3    Mixed hyperlipidemia  -on statin, will monitor    Primary hypertension  -good control    Erectile dysfunction, unspecified erectile dysfunction type  -trial of Viagra.  Side effects of medication(s) discussed as well as risks/benefits of taking    - sildenafil (VIAGRA) 100 MG tablet; Take 1 tablet (100 mg) by mouth daily as needed (sexual activity)    Class 2 severe obesity due to excess calories with serious comorbidity and body mass index (BMI) of 38.0 to 38.9 in adult (H)  -contributing to hx of hepatic steatosis and uncontrolled T2DM  -will attempt to get semaglutide approved  -Side effects of medication(s) discussed as well as risks/benefits of taking    - semaglutide (OZEMPIC) 2 MG/3ML pen; Inject 0.25 mg Subcutaneous every 7 days For month 1  - semaglutide (OZEMPIC) 2 MG/3ML pen; Inject 0.5 mg Subcutaneous every 7 days During month 2  - Semaglutide, 1 MG/DOSE, (OZEMPIC) 4 MG/3ML pen; Inject 1 mg Subcutaneous every 7 days For month 3    Hepatic steatosis    BMI:   Estimated body mass index is 38.08 kg/m  as calculated from the following:    Height as of 3/21/23: 1.8 m (5' 10.87\").    Weight as of this encounter: 123.4 " kg (272 lb).   Weight management plan: Discussed healthy diet and exercise guidelines also will try GLP1 for T2DM and obesity    Kat Serrato PA-C  [facility]    Roselia Paiz is a 42 year old, presenting for the following health issues:  Labs (A1c levels )        6/30/2023     3:24 PM   Additional Questions   Roomed by Tia     History of Present Illness       Reason for visit:  Check A/1c levels    He eats 2-3 servings of fruits and vegetables daily.He consumes 1 sweetened beverage(s) daily.He exercises with enough effort to increase his heart rate 30 to 60 minutes per day.  He exercises with enough effort to increase his heart rate 4 days per week.   He is taking medications regularly.         Review of Systems   Constitutional, HEENT, cardiovascular, pulmonary, gi and gu systems are negative, except as otherwise noted.      Objective    /73 (BP Location: Left arm, Patient Position: Sitting, Cuff Size: Adult Large)   Pulse 75   Temp 97.9  F (36.6  C) (Temporal)   Resp 18   Wt 123.4 kg (272 lb)   SpO2 96%   BMI 38.08 kg/m    Body mass index is 38.08 kg/m .  Physical Exam   GENERAL: healthy, alert and no distress  NECK: no adenopathy, no asymmetry, masses, or scars and thyroid normal to palpation  RESP: lungs clear to auscultation - no rales, rhonchi or wheezes  CV: regular rate and rhythm, normal S1 S2, no S3 or S4, no murmur, click or rub, no peripheral edema and peripheral pulses strong  ABDOMEN: soft, nontender, no hepatosplenomegaly, no masses and bowel sounds normal  MS: no gross musculoskeletal defects noted, no edema

## 2023-07-03 ENCOUNTER — MYC REFILL (OUTPATIENT)
Dept: FAMILY MEDICINE | Facility: CLINIC | Age: 43
End: 2023-07-03
Payer: COMMERCIAL

## 2023-07-03 DIAGNOSIS — E66.01 CLASS 2 SEVERE OBESITY DUE TO EXCESS CALORIES WITH SERIOUS COMORBIDITY AND BODY MASS INDEX (BMI) OF 38.0 TO 38.9 IN ADULT (H): ICD-10-CM

## 2023-07-03 DIAGNOSIS — E11.9 TYPE 2 DIABETES MELLITUS WITHOUT COMPLICATION, WITHOUT LONG-TERM CURRENT USE OF INSULIN (H): ICD-10-CM

## 2023-07-03 DIAGNOSIS — E66.812 CLASS 2 SEVERE OBESITY DUE TO EXCESS CALORIES WITH SERIOUS COMORBIDITY AND BODY MASS INDEX (BMI) OF 38.0 TO 38.9 IN ADULT (H): ICD-10-CM

## 2023-07-04 NOTE — TELEPHONE ENCOUNTER
Refill request Refused - Duplicate    Semaglutide 2mg/3ml was refilled on 6/30/2023 - Disp 3ml, Ref 0    Semaglutide 4mg/3ml was refilled on 6/30/2023 - Disp 3ml, Ref 0    Sent to [facility] In [place], [place]      Roxann Georges RN  07/04/23 12:09 PM  [facility] Nurse Advisor

## 2023-07-05 NOTE — TELEPHONE ENCOUNTER
Medication Appeal Initiation    We have initiated an appeal for the requested medication:  Medication: SEMAGLUTIDE(0.25 OR 0.5MG/DOS) 2 MG/3ML SC [facility]  Appeal Start Date:  7/5/2023  Insurance Company: [employer] - Phone [phone] Fax [phone]  Insurance Phone:   Insurance Fax: [phone]  Comments:

## 2023-07-19 RX ORDER — SILDENAFIL 100 MG/1
100 TABLET, FILM COATED ORAL DAILY PRN
Qty: 10 TABLET | Refills: 0 | OUTPATIENT
Start: 2023-07-19

## 2023-07-19 NOTE — TELEPHONE ENCOUNTER
"Last Written Prescription Date:  6/30/23  Last Fill Quantity: 10,  # refills: 1   Last office visit provider:  6/30/23     Requested Prescriptions   Pending Prescriptions Disp Refills     sildenafil (VIAGRA) 100 MG tablet 10 tablet 0     Sig: Take 1 tablet (100 mg) by mouth daily as needed       Erectile Dysfuction Protocol Passed - 7/19/2023  7:39 AM        Passed - Absence of nitrates on medication list        Passed - Absence of Alpha Blockers on Med list        Passed - Recent (12 mo) or future (30 days) visit within the authorizing provider's specialty     Patient has had an office visit with the authorizing provider or a provider within the authorizing providers department within the previous 12 mos or has a future within next 30 days. See \"Patient Info\" tab in inbasket, or \"Choose Columns\" in Meds & Orders section of the refill encounter.              Passed - Medication is active on med list        Passed - Patient is age 18 or older             Desiree Haro RN 07/19/23 3:56 PM  "

## 2023-07-24 DIAGNOSIS — N52.9 ERECTILE DYSFUNCTION, UNSPECIFIED ERECTILE DYSFUNCTION TYPE: ICD-10-CM

## 2023-07-24 RX ORDER — SILDENAFIL 100 MG/1
100 TABLET, FILM COATED ORAL DAILY PRN
Qty: 10 TABLET | Refills: 1 | Status: SHIPPED | OUTPATIENT
Start: 2023-07-24 | End: 2024-03-19

## 2023-07-24 NOTE — TELEPHONE ENCOUNTER
"Routing refill request to provider for review/approval because:  PRN Medication    Last Written Prescription Date:  6/30/2023  Last Fill Quantity: 10,  # refills: 1   Last office visit provider:  6/30/2023     Requested Prescriptions   Pending Prescriptions Disp Refills    sildenafil (VIAGRA) 100 MG tablet 10 tablet 1     Sig: Take 1 tablet (100 mg) by mouth daily as needed (sexual activity)       Erectile Dysfuction Protocol Passed - 7/24/2023 12:57 PM        Passed - Absence of nitrates on medication list        Passed - Absence of Alpha Blockers on Med list        Passed - Recent (12 mo) or future (30 days) visit within the authorizing provider's specialty     Patient has had an office visit with the authorizing provider or a provider within the authorizing providers department within the previous 12 mos or has a future within next 30 days. See \"Patient Info\" tab in inbasket, or \"Choose Columns\" in Meds & Orders section of the refill encounter.              Passed - Medication is active on med list        Passed - Patient is age 18 or older             JEFFY LARKIN RN 07/24/23 12:59 PM  "

## 2023-08-17 NOTE — TELEPHONE ENCOUNTER
MEDICATION APPEAL DENIED    Medication: SEMAGLUTIDE(0.25 OR 0.5MG/DOS) [facility]  Insurance Company: [employer] - Phone [phone] Fax [phone]   Denial Date: 7/28/2023  Denial Rational:       Second Level Appeal Information:       Central Prior Authorization Team ONLY: Second level appeals will be managed by the clinic staff and provider. Please contact the [employer] Prior Authorization Team if additional information about the denial is needed.

## 2023-09-08 ENCOUNTER — TELEPHONE (OUTPATIENT)
Dept: GASTROENTEROLOGY | Facility: CLINIC | Age: 43
End: 2023-09-08
Payer: COMMERCIAL

## 2023-09-08 ENCOUNTER — MYC MEDICAL ADVICE (OUTPATIENT)
Dept: FAMILY MEDICINE | Facility: CLINIC | Age: 43
End: 2023-09-08
Payer: COMMERCIAL

## 2023-09-08 DIAGNOSIS — E11.65 TYPE 2 DIABETES MELLITUS WITH HYPERGLYCEMIA, WITHOUT LONG-TERM CURRENT USE OF INSULIN (H): Primary | ICD-10-CM

## 2023-09-08 DIAGNOSIS — Z12.11 ENCOUNTER FOR SCREENING COLONOSCOPY: Primary | ICD-10-CM

## 2023-09-08 RX ORDER — BISACODYL 5 MG/1
TABLET, DELAYED RELEASE ORAL
Qty: 4 TABLET | Refills: 0 | Status: SHIPPED | OUTPATIENT
Start: 2023-09-08

## 2023-09-08 NOTE — TELEPHONE ENCOUNTER
Pre assessment completed for upcoming procedure.   (Please see previous telephone encounter notes for complete details)    Patient  returned call.       Procedure details:    Arrival time and facility location reviewed.    Pre op exam needed? N/A    Designated  policy reviewed. Instructed to have someone stay 6 hours post procedure.     COVID policy reviewed.      Medication review:    Medications reviewed. Please see supporting documentation below. Holding recommendations discussed (if applicable).     Patient reports they are not taking ozempic or trulicity at this time.     Prep for procedure:     Procedure prep instructions reviewed.        Additional information needed?  N/A      Patient  verbalized understanding and had no questions or concerns at this time.      Lexis Jon RN  Endoscopy Procedure Pre Assessment RN  [phone] option 4

## 2023-09-08 NOTE — TELEPHONE ENCOUNTER
Attempted to contact patient in order to complete pre assessment questions.     No answer. Left message to return call to [phone] option 4      Procedure details:    Patient scheduled for Colonoscopy  on 9.22.2023.     Arrival time: 0745. Procedure time 0830    Pre op exam needed? N/A    Facility location: [facility]; [facility] [address] [place] [address]    Sedation type: Conscious sedation     Indication for procedure: screening colonoscopy3      Chart review:     Electronic implanted devices? No    Diabetic? Yes. See medication holding recommendations     Diabetic medication HOLDING recommendations: (if applicable)  Oral diabetic medications: No  Diabetic injectables: Yes- Ozempic (Semaglutide). Weekly dosing of medication.  Hold 7 days before procedure.  Follow up with managing provider.   Trulicity (Dulaglutide).  Weekly dosing of medication.  Hold 7 days before procedure.  Follow up with managing provider.   Insulin: No      Medication review:    Anticoagulants? No    NSAIDS? Yes.  Naproxen (Naprosyn, Aleve).  Holding interval of 4 days.    Other medication HOLDING recommendations:  N/A      Prep for procedure:     Bowel prep recommendation: Standard Golytely   Due to: diabetes.     Prep instructions sent via INDOM. Bowel prep script sent to [id number] [place], [place] - [address]  Endoscopy Procedure Pre Assessment RN

## 2023-09-22 ENCOUNTER — HOSPITAL ENCOUNTER (OUTPATIENT)
Facility: CLINIC | Age: 43
Discharge: HOME | End: 2023-09-22
Attending: INTERNAL MEDICINE | Admitting: INTERNAL MEDICINE
Payer: COMMERCIAL

## 2023-09-22 VITALS
RESPIRATION RATE: 16 BRPM | BODY MASS INDEX: 37.1 KG/M2 | WEIGHT: 265 LBS | HEART RATE: 82 BPM | OXYGEN SATURATION: 95 % | DIASTOLIC BLOOD PRESSURE: 81 MMHG | HEIGHT: 71 IN | SYSTOLIC BLOOD PRESSURE: 131 MMHG

## 2023-09-22 LAB — COLONOSCOPY: NORMAL

## 2023-09-22 PROCEDURE — 45380 COLONOSCOPY AND BIOPSY: CPT | Mod: PT | Performed by: INTERNAL MEDICINE

## 2023-09-22 PROCEDURE — 88305 TISSUE EXAM BY PATHOLOGIST: CPT | Mod: TC | Performed by: INTERNAL MEDICINE

## 2023-09-22 PROCEDURE — 88305 TISSUE EXAM BY PATHOLOGIST: CPT | Mod: 26 | Performed by: PATHOLOGY

## 2023-09-22 PROCEDURE — G0500 MOD SEDAT ENDO SERVICE >5YRS: HCPCS | Mod: PT | Performed by: INTERNAL MEDICINE

## 2023-09-22 PROCEDURE — 250N000011 HC RX IP 250 OP 636: Performed by: INTERNAL MEDICINE

## 2023-09-22 RX ORDER — FENTANYL CITRATE 50 UG/ML
INJECTION, SOLUTION INTRAMUSCULAR; INTRAVENOUS PRN
Status: DISCONTINUED | OUTPATIENT
Start: 2023-09-22 | End: 2023-09-22 | Stop reason: HOSPADM

## 2023-09-22 ASSESSMENT — ACTIVITIES OF DAILY LIVING (ADL): ADLS_ACUITY_SCORE: 35

## 2023-09-22 NOTE — H&P
[facility] Anesthesia Pre-op History and Physical    Chencho Mane Jr. MRN# [id number]   Age: 43 year old YOB: 1980      Date of Surgery: 9/22/2023 [facility]      Date of Exam 9/22/2023 Facility (In hospital)       Home clinic:   Primary care provider: Kat Serrato         Chief Complaint and/or Reason for Procedure:   No chief complaint on file.  Colonoscopy  Screen. Uncle with CRC, no others.         Active problem list:     Patient Active Problem List    Diagnosis Date Noted    Class 2 severe obesity due to excess calories with serious comorbidity in adult (H) 06/30/2023     Priority: Medium    Testicular pain, right 03/09/2023     Priority: Medium    Type 2 diabetes mellitus without complication, without long-term current use of insulin (H) 12/07/2021     Priority: Medium    Atypical chest pain 11/02/2021     Priority: Medium     Formatting of this note might be different from the original.  Formatting of this note might be different from the original.  negative stress test 10/09  Formatting of this note might be different from the original.  negative stress test 10/09      Hypertension 11/02/2021     Priority: Medium    INOCENCIO on CPAP 10/26/2015     Priority: Medium    Fatty liver 01/08/2013     Priority: Medium    Elevated liver function tests 03/15/2012     Priority: Medium    Depression with anxiety 03/22/2010     Priority: Medium     Formatting of this note might be different from the original.  Formatting of this note might be different from the original.  On citalopram with good results, some prn xanax use  Formatting of this note might be different from the original.  On citalopram with good results, some prn xanax use      Mixed hyperlipidemia 03/22/2010     Priority: Medium     Formatting of this note might be different from the original.  Formatting of this note might be different from the original.  Low hdl and high ldl (152)  Started on zocor  1/10  Formatting of this note might be different from the original.  Low hdl and high ldl (152)  Started on zocor 1/10              Medications (include herbals and vitamins):   Any Plavix use in the last 7 days? No     No current facility-administered medications for this encounter.     Current Outpatient Medications   Medication Sig    aspirin 81 MG EC tablet Take 1 tablet (81 mg) by mouth daily    bisacodyl (DULCOLAX) 5 MG EC tablet Take 2 tablets at 3 pm the day before your procedure. If your procedure is before 11 am, take 2 additional tablets at 11 pm. If your procedure is after 11 am, take 2 additional tablets at 6 am. For additional instructions refer to your colonoscopy prep instructions.    citalopram (CELEXA) 40 MG tablet Take 1 tablet (40 mg) by mouth daily    doxycycline hyclate (VIBRAMYCIN) 100 MG capsule Take 1 capsule (100 mg) by mouth every 12 hours    dulaglutide (TRULICITY) 0.75 MG/0.5ML pen Inject 0.75 mg Subcutaneous every 7 days For 28 days, then increase to 1.5mg subcutaneous every 7 days    empagliflozin (JARDIANCE) 10 MG TABS tablet Take 1 tablet (10 mg) by mouth daily    hydrochlorothiazide (HYDRODIURIL) 25 MG tablet Take 1 tablet (25 mg) by mouth daily    lisinopril (ZESTRIL) 10 MG tablet Take 1 tablet (10 mg) by mouth daily    metoprolol tartrate (LOPRESSOR) 50 MG tablet Take 1 tablet (50 mg) by mouth 2 times daily    multivitamin w/minerals (THERA-VIT-M) tablet Take 1 tablet by mouth daily    naproxen (NAPROSYN) 250 MG tablet Take 1 tablet (250 mg) by mouth 2 times daily (with meals)    polyethylene glycol (GOLYTELY) 236 g suspension The night before the exam at 6 pm drink an 8-ounce glass every 15 minutes until the jug is half empty. If you arrive before 11 AM: Drink the other half of the PointBurstytely jug at 11 PM night before procedure. If you arrive after 11 AM: Drink the other half of the Golytely jug at 6 AM day of procedure. For additional instructions refer to your colonoscopy prep  instructions.    semaglutide (OZEMPIC) 2 MG/3ML pen Inject 0.25 mg Subcutaneous every 7 days For month 1    semaglutide (OZEMPIC) 2 MG/3ML pen Inject 0.5 mg Subcutaneous every 7 days During month 2    Semaglutide, 1 MG/DOSE, (OZEMPIC) 4 MG/3ML pen Inject 1 mg Subcutaneous every 7 days For month 3    sildenafil (VIAGRA) 100 MG tablet Take 1 tablet (100 mg) by mouth daily as needed (sexual activity)    simvastatin (ZOCOR) 40 MG tablet Take 1 tablet (40 mg) by mouth daily             Allergies:    No Known Allergies  Allergy to Latex? No  Allergy to tape?   No  Intolerances:             Physical Exam:   All vitals have been reviewed  No data found.  No intake/output data recorded.  Lungs:   No increased work of breathing, good air exchange, clear to auscultation bilaterally, no crackles or wheezing     Cardiovascular:   Normal apical impulse, regular rate and rhythm, normal S1 and S2, no S3 or S4, and no murmur noted             Lab / Radiology Results:            Anesthetic risk and/or ASA classification:       Hubert Walter MD

## 2023-09-22 NOTE — LETTER
September 26, 2023      Chencho Alhaji Cee  [address] [facility] [address]        Dear ,    We are writing to inform you of your test results.    Non-concerning polyps.  A repeat exam in 10 yrs suggested.    Resulted Orders   Surgical Pathology Exam   Result Value Ref Range    Case Report       Surgical Pathology Report                         Case: [id number]                                  Authorizing Provider:  Hubert Walter MD        Collected:           09/22/2023 08:51 AM          Ordering Location:     [facility]          Received:            09/22/2023 09:57 AM                                 [facility] Endoscopy                                                          Pathologist:           Prasanna Juárez MD                                                            Specimens:   A) - Large Intestine, Colon, Transverse                                                             B) - Large Intestine, Colon, Descending                                                    Final Diagnosis       A. Colon, transverse, polypectomy:  --Hyperplastic polyp.    B. Colon, transverse, polypectomy:  --Hyperplastic polyp.          Clinical Information       Procedure:  Colonoscopy  Pre-op Diagnosis: Colon cancer screening [Z12.11]  Post-op Diagnosis: Z12.11 - Colon cancer screening [ICD-10-CM]      Gross Description       A(1). Large Intestine, Colon, Transverse, :  The specimen is received in formalin, labeled with the patient's name, medical record number and other identifying information and designated  transverse colon polyp . It consists of a single tan soft tissue fragment measuring 0.3 cm. Entirely submitted in one cassette.    B(2). Large Intestine, Colon, Descending, :  The specimen is received in formalin, labeled with the patient's name, medical record number and other identifying information and designated  descending colon polyp . It consists of a single tan soft tissue fragment  measuring 0.2 cm. Entirely submitted in one cassette.  ([facility])      Microscopic Description       Microscopic examination was performed.        Performing Labs       The technical component of this testing was completed at [facility]      Case Images         If you have any questions or concerns, please call the clinic at the number listed above.       Sincerely,      Hubert Walter MD

## 2023-09-26 LAB
PATH REPORT.COMMENTS IMP SPEC: NORMAL
PATH REPORT.COMMENTS IMP SPEC: NORMAL
PATH REPORT.FINAL DX SPEC: NORMAL
PATH REPORT.GROSS SPEC: NORMAL
PATH REPORT.MICROSCOPIC SPEC OTHER STN: NORMAL
PATH REPORT.RELEVANT HX SPEC: NORMAL
PHOTO IMAGE: NORMAL

## 2023-10-21 ENCOUNTER — HEALTH MAINTENANCE LETTER (OUTPATIENT)
Age: 43
End: 2023-10-21

## 2023-12-20 ENCOUNTER — OFFICE VISIT (OUTPATIENT)
Dept: FAMILY MEDICINE | Facility: CLINIC | Age: 43
End: 2023-12-20
Payer: COMMERCIAL

## 2023-12-20 VITALS
HEIGHT: 71 IN | HEART RATE: 80 BPM | BODY MASS INDEX: 36.96 KG/M2 | RESPIRATION RATE: 16 BRPM | OXYGEN SATURATION: 96 % | WEIGHT: 264 LBS | DIASTOLIC BLOOD PRESSURE: 76 MMHG | TEMPERATURE: 98.1 F | SYSTOLIC BLOOD PRESSURE: 128 MMHG

## 2023-12-20 DIAGNOSIS — E78.2 MIXED HYPERLIPIDEMIA: ICD-10-CM

## 2023-12-20 DIAGNOSIS — E66.01 CLASS 2 SEVERE OBESITY DUE TO EXCESS CALORIES WITH SERIOUS COMORBIDITY AND BODY MASS INDEX (BMI) OF 38.0 TO 38.9 IN ADULT (H): ICD-10-CM

## 2023-12-20 DIAGNOSIS — F41.0 PANIC ATTACK: ICD-10-CM

## 2023-12-20 DIAGNOSIS — E66.812 CLASS 2 SEVERE OBESITY DUE TO EXCESS CALORIES WITH SERIOUS COMORBIDITY AND BODY MASS INDEX (BMI) OF 38.0 TO 38.9 IN ADULT (H): ICD-10-CM

## 2023-12-20 DIAGNOSIS — F41.8 DEPRESSION WITH ANXIETY: ICD-10-CM

## 2023-12-20 DIAGNOSIS — R10.9 RIGHT FLANK PAIN: ICD-10-CM

## 2023-12-20 DIAGNOSIS — I10 PRIMARY HYPERTENSION: ICD-10-CM

## 2023-12-20 DIAGNOSIS — E11.9 TYPE 2 DIABETES MELLITUS WITHOUT COMPLICATION, WITHOUT LONG-TERM CURRENT USE OF INSULIN (H): Primary | ICD-10-CM

## 2023-12-20 LAB
ALBUMIN UR-MCNC: NEGATIVE MG/DL
APPEARANCE UR: CLEAR
BACTERIA #/AREA URNS HPF: NORMAL /HPF
BILIRUB UR QL STRIP: NEGATIVE
COLOR UR AUTO: YELLOW
GLUCOSE UR STRIP-MCNC: >=1000 MG/DL
HBA1C MFR BLD: 6.8 % (ref 0–5.6)
HGB UR QL STRIP: ABNORMAL
KETONES UR STRIP-MCNC: ABNORMAL MG/DL
LEUKOCYTE ESTERASE UR QL STRIP: NEGATIVE
NITRATE UR QL: NEGATIVE
PH UR STRIP: 5.5 [PH] (ref 5–8)
RBC #/AREA URNS AUTO: NORMAL /HPF
SP GR UR STRIP: 1.02 (ref 1–1.03)
UROBILINOGEN UR STRIP-ACNC: 0.2 E.U./DL
WBC #/AREA URNS AUTO: NORMAL /HPF

## 2023-12-20 PROCEDURE — 81001 URINALYSIS AUTO W/SCOPE: CPT | Performed by: PHYSICIAN ASSISTANT

## 2023-12-20 PROCEDURE — 36415 COLL VENOUS BLD VENIPUNCTURE: CPT | Performed by: PHYSICIAN ASSISTANT

## 2023-12-20 PROCEDURE — 91320 SARSCV2 VAC 30MCG TRS-SUC IM: CPT | Performed by: PHYSICIAN ASSISTANT

## 2023-12-20 PROCEDURE — 83036 HEMOGLOBIN GLYCOSYLATED A1C: CPT | Performed by: PHYSICIAN ASSISTANT

## 2023-12-20 PROCEDURE — 90677 PCV20 VACCINE IM: CPT | Performed by: PHYSICIAN ASSISTANT

## 2023-12-20 PROCEDURE — 90480 ADMN SARSCOV2 VAC 1/ONLY CMP: CPT | Performed by: PHYSICIAN ASSISTANT

## 2023-12-20 PROCEDURE — 90471 IMMUNIZATION ADMIN: CPT | Performed by: PHYSICIAN ASSISTANT

## 2023-12-20 PROCEDURE — 99214 OFFICE O/P EST MOD 30 MIN: CPT | Mod: 25 | Performed by: PHYSICIAN ASSISTANT

## 2023-12-20 PROCEDURE — 80048 BASIC METABOLIC PNL TOTAL CA: CPT | Performed by: PHYSICIAN ASSISTANT

## 2023-12-20 RX ORDER — HYDROXYZINE HYDROCHLORIDE 25 MG/1
25 TABLET, FILM COATED ORAL
COMMUNITY
Start: 2022-07-14

## 2023-12-20 RX ORDER — HYDROXYZINE HYDROCHLORIDE 25 MG/1
25 TABLET, FILM COATED ORAL 3 TIMES DAILY PRN
Qty: 30 TABLET | Refills: 2 | Status: SHIPPED | OUTPATIENT
Start: 2023-12-20 | End: 2024-06-13

## 2023-12-20 NOTE — PROGRESS NOTES
"  Assessment & Plan     Type 2 diabetes mellitus without complication, without long-term current use of insulin (H)  -excellent control!   -continue current regimen  -recheck in 6 months  - Basic metabolic panel  (Ca, Cl, CO2, Creat, Gluc, K, Na, BUN); Future  - Hemoglobin A1c; Future  - Basic metabolic panel  (Ca, Cl, CO2, Creat, Gluc, K, Na, BUN)  - Hemoglobin A1c    Class 2 severe obesity due to excess calories with serious comorbidity and body mass index (BMI) of 38.0 to 38.9 in adult (H)    Mixed hyperlipidemia    Depression with anxiety  -monitor increased anxiety  -refilled hydroxyzine    Primary hypertension  -stable    Right flank pain  -UA today WNL  -monitor sxs  -discussed alarm signs and symptoms to monitor for and discussed when to be reevaluated in the UC or ED   - UA with Microscopic reflex to Culture - Clinic Collect  - UA Microscopic with Reflex to Culture    Panic attack  -refills sent  -follow-up if this is not managed well  - hydrOXYzine HCl (ATARAX) 25 MG tablet; Take 1 tablet (25 mg) by mouth 3 times daily as needed for anxiety         BMI:   Estimated body mass index is 36.96 kg/m  as calculated from the following:    Height as of this encounter: 1.8 m (5' 10.87\").    Weight as of this encounter: 119.7 kg (264 lb).     Kat Serrato PA-C  [facility]    Roselia Paiz is a 43 year old, presenting for the following health issues:  RECHECK (Pt is here for follow up, pt has no concerns.)      12/20/2023     3:11 PM   Additional Questions   Roomed by Christine   Accompanied by self       -recheck T2DM, on Jardiance  -not checking suagrs  -No polyuria, polydipsia, blurry vision, chest pain, dyspnea or claudication.  No foot burning, numbness or pain. Taking medication compliantly without noted sided effects. Follows diet fairly well.     -BP managed well  -Patient denies any exertional chest pain, dyspnea, palpitations, syncope, orthopnea, edema or paroxysmal " "nocturnal dyspnea.     -anxiety is ok, having more panic than she is used to  -hydroxyzine is       History of Present Illness       Diabetes:   He presents for follow up of diabetes.    He is not checking blood glucose.         He has no concerns regarding his diabetes at this time.   He is not experiencing numbness or burning in feet, excessive thirst, blurry vision, weight changes or redness, sores or blisters on feet. The patient has not had a diabetic eye exam in the last 12 months.          He eats 2-3 servings of fruits and vegetables daily.He consumes 0 sweetened beverage(s) daily.He exercises with enough effort to increase his heart rate 20 to 29 minutes per day.  He exercises with enough effort to increase his heart rate 4 days per week.   He is taking medications regularly.     Review of Systems   Constitutional, HEENT, cardiovascular, pulmonary, gi and gu systems are negative, except as otherwise noted.      Objective    /76 (BP Location: Left arm, Patient Position: Sitting, Cuff Size: Adult Large)   Pulse 80   Temp 98.1  F (36.7  C) (Temporal)   Resp 16   Ht 1.8 m (5' 10.87\")   Wt 119.7 kg (264 lb)   SpO2 96%   BMI 36.96 kg/m    Body mass index is 36.96 kg/m .  Physical Exam   GENERAL: healthy, alert and no distress  EYES: Eyes grossly normal to inspection, PERRL and conjunctivae and sclerae normal  HENT: ear canals and TM's normal, nose and mouth without ulcers or lesions  NECK: no adenopathy, no asymmetry, masses, or scars and thyroid normal to palpation  RESP: lungs clear to auscultation - no rales, rhonchi or wheezes  CV: regular rate and rhythm, normal S1 S2, no S3 or S4, no murmur, click or rub, no peripheral edema and peripheral pulses strong  ABDOMEN: soft, nontender, no hepatosplenomegaly, no masses and bowel sounds normal  MS: no gross musculoskeletal defects noted, no edema  SKIN: no suspicious lesions or rashes  NEURO: Normal strength and tone, mentation intact and speech " normal  PSYCH: mentation appears normal, affect normal/bright    Results for orders placed or performed in visit on 12/20/23 (from the past 24 hour(s))   Hemoglobin A1c   Result Value Ref Range    Hemoglobin A1C 6.8 (H) 0.0 - 5.6 %   UA with Microscopic reflex to Culture - Clinic Collect    Specimen: Urine, Midstream   Result Value Ref Range    Color Urine Yellow Colorless, Straw, Light Yellow, Yellow    Appearance Urine Clear Clear    Glucose Urine >=1000 (A) Negative mg/dL    Bilirubin Urine Negative Negative    Ketones Urine Trace (A) Negative mg/dL    Specific Gravity Urine 1.020 1.005 - 1.030    Blood Urine Trace (A) Negative    pH Urine 5.5 5.0 - 8.0    Protein Albumin Urine Negative Negative mg/dL    Urobilinogen Urine 0.2 0.2, 1.0 E.U./dL    Nitrite Urine Negative Negative    Leukocyte Esterase Urine Negative Negative   UA Microscopic with Reflex to Culture   Result Value Ref Range    Bacteria Urine None Seen None Seen /HPF    RBC Urine None Seen 0-2 /HPF /HPF    WBC Urine None Seen 0-5 /HPF /HPF    Narrative    Urine Culture not indicated         Prior to immunization administration, verified patients identity using patient s name and date of birth. Please see Immunization Activity for additional information.     Screening Questionnaire for Adult Immunization    Are you sick today?   No   Do you have allergies to medications, food, a vaccine component or latex?   No   Have you ever had a serious reaction after receiving a vaccination?   No   Do you have a long-term health problem with heart, lung, kidney, or metabolic disease (e.g., diabetes), asthma, a blood disorder, no spleen, complement component deficiency, a cochlear implant, or a spinal fluid leak?  Are you on long-term aspirin therapy?   No   Do you have cancer, leukemia, HIV/AIDS, or any other immune system problem?   No   Do you have a parent, brother, or sister with an immune system problem?   No   In the past 3 months, have you taken medications  that affect  your immune system, such as prednisone, other steroids, or anticancer drugs; drugs for the treatment of rheumatoid arthritis, Crohn s disease, or psoriasis; or have you had radiation treatments?   No   Have you had a seizure, or a brain or other nervous system problem?   No   During the past year, have you received a transfusion of blood or blood    products, or been given immune (gamma) globulin or antiviral drug?   No   For women: Are you pregnant or is there a chance you could become       pregnant during the next month?   No   Have you received any vaccinations in the past 4 weeks?   No     Immunization questionnaire answers were all negative.      Patient instructed to remain in clinic for 15 minutes afterwards, and to report any adverse reactions.     Screening performed by Christine Rider MA on 12/20/2023 at 3:16 PM.

## 2023-12-21 LAB
ANION GAP SERPL CALCULATED.3IONS-SCNC: 12 MMOL/L (ref 7–15)
BUN SERPL-MCNC: 15.1 MG/DL (ref 6–20)
CALCIUM SERPL-MCNC: 10 MG/DL (ref 8.6–10)
CHLORIDE SERPL-SCNC: 99 MMOL/L (ref 98–107)
CREAT SERPL-MCNC: 1.04 MG/DL (ref 0.67–1.17)
DEPRECATED HCO3 PLAS-SCNC: 29 MMOL/L (ref 22–29)
EGFRCR SERPLBLD CKD-EPI 2021: >90 ML/MIN/1.73M2
GLUCOSE SERPL-MCNC: 101 MG/DL (ref 70–99)
POTASSIUM SERPL-SCNC: 4 MMOL/L (ref 3.4–5.3)
SODIUM SERPL-SCNC: 140 MMOL/L (ref 135–145)

## 2024-01-03 ENCOUNTER — HOSPITAL ENCOUNTER (EMERGENCY)
Facility: CLINIC | Age: 44
Discharge: HOME | End: 2024-01-03
Attending: EMERGENCY MEDICINE | Admitting: EMERGENCY MEDICINE
Payer: COMMERCIAL

## 2024-01-03 ENCOUNTER — APPOINTMENT (OUTPATIENT)
Dept: RADIOLOGY | Facility: CLINIC | Age: 44
End: 2024-01-03
Attending: EMERGENCY MEDICINE
Payer: COMMERCIAL

## 2024-01-03 VITALS
DIASTOLIC BLOOD PRESSURE: 70 MMHG | HEART RATE: 97 BPM | BODY MASS INDEX: 36.4 KG/M2 | WEIGHT: 260 LBS | TEMPERATURE: 99.3 F | HEIGHT: 71 IN | RESPIRATION RATE: 18 BRPM | SYSTOLIC BLOOD PRESSURE: 128 MMHG | OXYGEN SATURATION: 97 %

## 2024-01-03 DIAGNOSIS — R07.89 ATYPICAL CHEST PAIN: ICD-10-CM

## 2024-01-03 LAB
ALBUMIN UR-MCNC: 30 MG/DL
ANION GAP SERPL CALCULATED.3IONS-SCNC: 11 MMOL/L (ref 7–15)
APPEARANCE UR: CLEAR
ATRIAL RATE - MUSE: 112 BPM
BASOPHILS # BLD AUTO: 0 10E3/UL (ref 0–0.2)
BASOPHILS NFR BLD AUTO: 0 %
BILIRUB UR QL STRIP: NEGATIVE
BUN SERPL-MCNC: 14.5 MG/DL (ref 6–20)
CALCIUM SERPL-MCNC: 10.6 MG/DL (ref 8.6–10)
CHLORIDE SERPL-SCNC: 95 MMOL/L (ref 98–107)
COLOR UR AUTO: YELLOW
CREAT SERPL-MCNC: 1.27 MG/DL (ref 0.67–1.17)
DEPRECATED HCO3 PLAS-SCNC: 30 MMOL/L (ref 22–29)
DIASTOLIC BLOOD PRESSURE - MUSE: 70 MMHG
EGFRCR SERPLBLD CKD-EPI 2021: 71 ML/MIN/1.73M2
EOSINOPHIL # BLD AUTO: 0 10E3/UL (ref 0–0.7)
EOSINOPHIL NFR BLD AUTO: 0 %
ERYTHROCYTE [DISTWIDTH] IN BLOOD BY AUTOMATED COUNT: 13.9 % (ref 10–15)
FLUAV RNA SPEC QL NAA+PROBE: NEGATIVE
FLUBV RNA RESP QL NAA+PROBE: NEGATIVE
GLUCOSE BLDC GLUCOMTR-MCNC: 145 MG/DL (ref 70–99)
GLUCOSE SERPL-MCNC: 160 MG/DL (ref 70–99)
GLUCOSE UR STRIP-MCNC: >1000 MG/DL
HCT VFR BLD AUTO: 44.9 % (ref 40–53)
HGB BLD-MCNC: 14.7 G/DL (ref 13.3–17.7)
HGB UR QL STRIP: NEGATIVE
HYALINE CASTS: 1 /LPF
IMM GRANULOCYTES # BLD: 0 10E3/UL
IMM GRANULOCYTES NFR BLD: 0 %
INTERPRETATION ECG - MUSE: NORMAL
KETONES UR STRIP-MCNC: NEGATIVE MG/DL
LEUKOCYTE ESTERASE UR QL STRIP: NEGATIVE
LYMPHOCYTES # BLD AUTO: 1.1 10E3/UL (ref 0.8–5.3)
LYMPHOCYTES NFR BLD AUTO: 12 %
MCH RBC QN AUTO: 27.7 PG (ref 26.5–33)
MCHC RBC AUTO-ENTMCNC: 32.7 G/DL (ref 31.5–36.5)
MCV RBC AUTO: 85 FL (ref 78–100)
MONOCYTES # BLD AUTO: 0.5 10E3/UL (ref 0–1.3)
MONOCYTES NFR BLD AUTO: 5 %
MUCOUS THREADS #/AREA URNS LPF: PRESENT /LPF
NEUTROPHILS # BLD AUTO: 7.9 10E3/UL (ref 1.6–8.3)
NEUTROPHILS NFR BLD AUTO: 83 %
NITRATE UR QL: NEGATIVE
NRBC # BLD AUTO: 0 10E3/UL
NRBC BLD AUTO-RTO: 0 /100
P AXIS - MUSE: 60 DEGREES
PH UR STRIP: 7 [PH] (ref 5–7)
PLATELET # BLD AUTO: 252 10E3/UL (ref 150–450)
POTASSIUM SERPL-SCNC: 4.3 MMOL/L (ref 3.4–5.3)
PR INTERVAL - MUSE: 166 MS
QRS DURATION - MUSE: 92 MS
QT - MUSE: 316 MS
QTC - MUSE: 431 MS
R AXIS - MUSE: 14 DEGREES
RBC # BLD AUTO: 5.31 10E6/UL (ref 4.4–5.9)
RBC URINE: 3 /HPF
RSV RNA SPEC NAA+PROBE: NEGATIVE
SARS-COV-2 RNA RESP QL NAA+PROBE: NEGATIVE
SODIUM SERPL-SCNC: 136 MMOL/L (ref 135–145)
SP GR UR STRIP: 1.03 (ref 1–1.03)
SQUAMOUS EPITHELIAL: <1 /HPF
SYSTOLIC BLOOD PRESSURE - MUSE: 132 MMHG
T AXIS - MUSE: -14 DEGREES
TROPONIN T SERPL HS-MCNC: <6 NG/L
UROBILINOGEN UR STRIP-MCNC: <2 MG/DL
VENTRICULAR RATE- MUSE: 112 BPM
WBC # BLD AUTO: 9.6 10E3/UL (ref 4–11)
WBC URINE: 3 /HPF

## 2024-01-03 PROCEDURE — 93005 ELECTROCARDIOGRAM TRACING: CPT | Performed by: EMERGENCY MEDICINE

## 2024-01-03 PROCEDURE — 250N000013 HC RX MED GY IP 250 OP 250 PS 637: Performed by: EMERGENCY MEDICINE

## 2024-01-03 PROCEDURE — 99285 EMERGENCY DEPT VISIT HI MDM: CPT | Mod: 25

## 2024-01-03 PROCEDURE — 82962 GLUCOSE BLOOD TEST: CPT

## 2024-01-03 PROCEDURE — 80048 BASIC METABOLIC PNL TOTAL CA: CPT | Performed by: EMERGENCY MEDICINE

## 2024-01-03 PROCEDURE — 81001 URINALYSIS AUTO W/SCOPE: CPT | Performed by: EMERGENCY MEDICINE

## 2024-01-03 PROCEDURE — 85004 AUTOMATED DIFF WBC COUNT: CPT | Performed by: EMERGENCY MEDICINE

## 2024-01-03 PROCEDURE — 36415 COLL VENOUS BLD VENIPUNCTURE: CPT | Performed by: EMERGENCY MEDICINE

## 2024-01-03 PROCEDURE — 84484 ASSAY OF TROPONIN QUANT: CPT | Performed by: EMERGENCY MEDICINE

## 2024-01-03 PROCEDURE — 87637 SARSCOV2&INF A&B&RSV AMP PRB: CPT | Performed by: EMERGENCY MEDICINE

## 2024-01-03 PROCEDURE — 71046 X-RAY EXAM CHEST 2 VIEWS: CPT

## 2024-01-03 RX ORDER — ASPIRIN 81 MG/1
324 TABLET, CHEWABLE ORAL ONCE
Status: COMPLETED | OUTPATIENT
Start: 2024-01-03 | End: 2024-01-03

## 2024-01-03 RX ADMIN — ASPIRIN 81 MG CHEWABLE TABLET 324 MG: 81 TABLET CHEWABLE at 16:57

## 2024-01-03 NOTE — ED PROVIDER NOTES
EMERGENCY DEPARTMENT ENCOUNTER      NAME: Chencho Mane Jr.  AGE: 43 year old male  YOB: 1980  MRN: [id number]  EVALUATION DATE & TIME: No admission date for patient encounter.    PCP: Kat Serrato    ED PROVIDER: Manisha Crowder MD      Chief Complaint   Patient presents with    Chest Pain    Back Pain    Shortness of Breath         FINAL IMPRESSION:  1. Atypical chest pain          ED COURSE & MEDICAL DECISION MAKING:    Pertinent Labs & Imaging studies reviewed. (See chart for details)  43 year old male presents to the Emergency Department for evaluation of chest pain that he describes as a constant chest pressure since it started around 4 this morning.  He has also had associated fatigue, chills, cough that started this morning.  His wife just tested positive for influenza A.  His viral panel is negative, chest x-ray is unremarkable.  Single troponin was negative, less than 6.  Patient is otherwise well-appearing, nontoxic.  I suspect that the patient has a viral syndrome with musculoskeletal chest wall pain.  Recommend supportive management.  ECG with no acute ischemic changes.    At the conclusion of the encounter I discussed the results of all of the tests and the disposition. The questions were answered. The patient or family acknowledged understanding and was agreeable with the care plan.       3:18 PM I met with the patient, obtained history, performed an initial exam, and discussed options and plan for diagnostics and treatment here in the ED.  4:32 PM I rechecked and updated patient on results. We discussed the plan for discharge and the patient is agreeable. Reviewed supportive cares, symptomatic treatment, outpatient follow up, and reasons to return to the Emergency Department. Patient to be discharged by ED RN.       Medical Decision Making  Obtained supplemental history:Supplemental history obtained?: No  Reviewed external records: External records reviewed?: No  Care  "impacted by chronic illness:Diabetes, Hyperlipidemia, and Hypertension  Care significantly affected by social determinants of health:Access to Medical Care  Did you consider but not order tests?: Work up considered but not performed and documented in chart, if applicable  Did you interpret images independently?: Independent interpretation of ECG and images noted in documentation, when applicable.  Consultation discussion with other provider:Did you involve another provider (consultant, , pharmacy, etc.)?: No  Discharge. No recommendations on prescription strength medication(s). See documentation for any additional details.      MEDICATIONS GIVEN IN THE EMERGENCY:  Medications   aspirin (ASA) chewable tablet 324 mg (has no administration in time range)       NEW PRESCRIPTIONS STARTED AT TODAY'S ER VISIT  New Prescriptions    No medications on file          =================================================================    HPI    Patient information was obtained from: Patient    Use of : N/A         Chencho Mane Jr. is a 43 year old male with a pertinent history of HTN, hyperlipidemia, DM2, depression with anxiety, who presents to this ED via walk-in for evaluation of chest pain.    Patient reports mid chest pain that started this morning around 0400 when he woke up. He describes pain as a pressure sensation as if someone is \"pressing down\" on his chest. Patient initially thought his symptoms were due to anxiety so he took hydroxyzine. Since onset, pain has been constant.  He reports associating fatigue, chills, as well as a cough that started this morning. Of note, patient mentions that his wife tested positive or Influenza A yesterday. Otherwise, he denies any fevers, nausea, abdominal pain, and urinary symptoms.   Patient reports a history of hypertension and diabetes. He does mention he has had increased thirst recently but states that he is unsure what his sugars have been running at home. He " denies any personal cardiac history aside from palpitations. Patient mentions that his grandfather did have a heart attack at the age of 40. He is currently taking Simvastatin, Metoprolol, and Jardiance, which he started three months ago. Patient has not had to use an inhaler in the past. Patient does not smoke. No other complaints at this time.     PAST MEDICAL HISTORY:  Past Medical History:   Diagnosis Date    Hypertension     Palpitations     Sleep apnea        PAST SURGICAL HISTORY:  Past Surgical History:   Procedure Laterality Date    ANGIOGRAM      COLONOSCOPY N/A 9/22/2023    Procedure: Colonoscopy;  Surgeon: Hubert Walter MD;  Location:  GI    HERNIA REPAIR      age 4           CURRENT MEDICATIONS:    aspirin 81 MG EC tablet  bisacodyl (DULCOLAX) 5 MG EC tablet  citalopram (CELEXA) 40 MG tablet  doxycycline hyclate (VIBRAMYCIN) 100 MG capsule  empagliflozin (JARDIANCE) 10 MG TABS tablet  hydrochlorothiazide (HYDRODIURIL) 25 MG tablet  hydrOXYzine HCl (ATARAX) 25 MG tablet  hydrOXYzine HCl (ATARAX) 25 MG tablet  lisinopril (ZESTRIL) 10 MG tablet  metoprolol tartrate (LOPRESSOR) 50 MG tablet  multivitamin w/minerals (THERA-VIT-M) tablet  naproxen (NAPROSYN) 250 MG tablet  polyethylene glycol (GOLYTELY) 236 g suspension  semaglutide (OZEMPIC) 2 MG/3ML pen  semaglutide (OZEMPIC) 2 MG/3ML pen  Semaglutide, 1 MG/DOSE, (OZEMPIC) 4 MG/3ML pen  sildenafil (VIAGRA) 100 MG tablet  simvastatin (ZOCOR) 40 MG tablet        ALLERGIES:  No Known Allergies    FAMILY HISTORY:  History reviewed. No pertinent family history.    SOCIAL HISTORY:   Social History     Socioeconomic History    Marital status:    Tobacco Use    Smoking status: Never     Passive exposure: Past    Smokeless tobacco: Never   Vaping Use    Vaping Use: Never used   Substance and Sexual Activity    Alcohol use: Yes     Comment: socially    Drug use: Never     Social Determinants of Health     Financial Resource Strain: Low Risk  (12/13/2023)  "   Financial Resource Strain     Within the past 12 months, have you or your family members you live with been unable to get utilities (heat, electricity) when it was really needed?: No   Food Insecurity: Low Risk  (12/13/2023)    Food Insecurity     Within the past 12 months, did you worry that your food would run out before you got money to buy more?: No     Within the past 12 months, did the food you bought just not last and you didn t have money to get more?: No   Transportation Needs: Low Risk  (12/13/2023)    Transportation Needs     Within the past 12 months, has lack of transportation kept you from medical appointments, getting your medicines, non-medical meetings or appointments, work, or from getting things that you need?: No   Interpersonal Safety: Low Risk  (12/20/2023)    Interpersonal Safety     Do you feel physically and emotionally safe where you currently live?: Yes     Within the past 12 months, have you been hit, slapped, kicked or otherwise physically hurt by someone?: No     Within the past 12 months, have you been humiliated or emotionally abused in other ways by your partner or ex-partner?: No   Housing Stability: Low Risk  (12/13/2023)    Housing Stability     Do you have housing? : Yes     Are you worried about losing your housing?: No       VITALS:  /70   Pulse 110   Temp 99.3  F (37.4  C) (Oral)   Resp 16   Ht 1.803 m (5' 11\")   Wt 117.9 kg (260 lb)   SpO2 95%   BMI 36.26 kg/m      PHYSICAL EXAM    Constitutional: Well developed, Well nourished, NAD  HENT: Normocephalic, Atraumatic, Bilateral external ears normal, Oropharynx normal, mucous membranes moist, Nose normal.   Neck- Normal range of motion, No tenderness, Supple, No stridor.  Eyes: PERRL, EOMI, Conjunctiva normal, No discharge.   Respiratory: Normal breath sounds, No respiratory distress. Tenderness to palpation to central anterior chest  Cardiovascular: Normal heart rate, Regular rhythm  GI: Bowel sounds normal, " Soft, No tenderness,   Musculoskeletal: No edema. Good range of motion in all major joints. No tenderness to palpation or major deformities noted.   Integument: Warm, Dry, No erythema, No rash  Neurologic: Alert & oriented x 3, Normal motor function, Normal sensory function, No focal deficits noted. Normal gait.   Psychiatric: Affect normal, Judgment normal, Mood normal.      LAB:  All pertinent labs reviewed and interpreted.  Results for orders placed or performed during the hospital encounter of 01/03/24   XR Chest 2 Views    Impression    IMPRESSION: No focal airspace opacity. No pleural effusion or pneumothorax. Cardiac silhouette and mediastinal contours are normal.   Symptomatic Influenza A/B, RSV, & SARS-CoV2 PCR (COVID-19) Nasopharyngeal    Specimen: Nasopharyngeal; Swab   Result Value Ref Range    Influenza A PCR Negative Negative    Influenza B PCR Negative Negative    RSV PCR Negative Negative    SARS CoV2 PCR Negative Negative   Basic metabolic panel   Result Value Ref Range    Sodium 136 135 - 145 mmol/L    Potassium 4.3 3.4 - 5.3 mmol/L    Chloride 95 (L) 98 - 107 mmol/L    Carbon Dioxide (CO2) 30 (H) 22 - 29 mmol/L    Anion Gap 11 7 - 15 mmol/L    Urea Nitrogen 14.5 6.0 - 20.0 mg/dL    Creatinine 1.27 (H) 0.67 - 1.17 mg/dL    GFR Estimate 71 >60 mL/min/1.73m2    Calcium 10.6 (H) 8.6 - 10.0 mg/dL    Glucose 160 (H) 70 - 99 mg/dL   Result Value Ref Range    Troponin T, High Sensitivity <6 <=22 ng/L   UA with Microscopic reflex to Culture    Specimen: Urine, Clean Catch   Result Value Ref Range    Color Urine Yellow Colorless, Straw, Light Yellow, Yellow    Appearance Urine Clear Clear    Glucose Urine >1000 (A) Negative mg/dL    Bilirubin Urine Negative Negative    Ketones Urine Negative Negative mg/dL    Specific Gravity Urine 1.030 1.001 - 1.030    Blood Urine Negative Negative    pH Urine 7.0 5.0 - 7.0    Protein Albumin Urine 30 (A) Negative mg/dL    Urobilinogen Urine <2.0 <2.0 mg/dL    Nitrite  Urine Negative Negative    Leukocyte Esterase Urine Negative Negative    Mucus Urine Present (A) None Seen /LPF    RBC Urine 3 (H) <=2 /HPF    WBC Urine 3 <=5 /HPF    Squamous Epithelials Urine <1 <=1 /HPF    Hyaline Casts Urine 1 <=2 /LPF   CBC with platelets and differential   Result Value Ref Range    WBC Count 9.6 4.0 - 11.0 10e3/uL    RBC Count 5.31 4.40 - 5.90 10e6/uL    Hemoglobin 14.7 13.3 - 17.7 g/dL    Hematocrit 44.9 40.0 - 53.0 %    MCV 85 78 - 100 fL    MCH 27.7 26.5 - 33.0 pg    MCHC 32.7 31.5 - 36.5 g/dL    RDW 13.9 10.0 - 15.0 %    Platelet Count 252 150 - 450 10e3/uL    % Neutrophils 83 %    % Lymphocytes 12 %    % Monocytes 5 %    % Eosinophils 0 %    % Basophils 0 %    % Immature Granulocytes 0 %    NRBCs per 100 WBC 0 <1 /100    Absolute Neutrophils 7.9 1.6 - 8.3 10e3/uL    Absolute Lymphocytes 1.1 0.8 - 5.3 10e3/uL    Absolute Monocytes 0.5 0.0 - 1.3 10e3/uL    Absolute Eosinophils 0.0 0.0 - 0.7 10e3/uL    Absolute Basophils 0.0 0.0 - 0.2 10e3/uL    Absolute Immature Granulocytes 0.0 <=0.4 10e3/uL    Absolute NRBCs 0.0 10e3/uL   Glucose by meter   Result Value Ref Range    GLUCOSE BY METER POCT 145 (H) 70 - 99 mg/dL       RADIOLOGY:  Reviewed all pertinent imaging. Please see official radiology report.  XR Chest 2 Views   Final Result   IMPRESSION: No focal airspace opacity. No pleural effusion or pneumothorax. Cardiac silhouette and mediastinal contours are normal.          EKG:    Performed at: 14:02    Impression: sinus tachycardia    Rate: 112 bpm  Rhythm: sinus tachycardia    MS Interval: 166 ms  QRS Interval: 92 ms  QTc Interval: 431 ms  ST Changes: no acute ischemia  Comparison: vent rate has increased by 48 bpm when compared to EKG from 12/21/09    I have independently reviewed and interpreted the EKG(s) documented above.        I, Kat Rodriguez, am serving as a scribe to document services personally performed by Manisha Crowder, based on my observation and the provider's statements to  me. I, Manisha Crowder MD, attest that Kat Rodriguez is acting in a scribe capacity, has observed my performance of the services and has documented them in accordance with my direction.    Manisha Crowder MD  Emergency Medicine  [facility] EMERGENCY ROOM  [address] [id number] [phone]       Manisha Crowder MD  01/03/24 [id number]

## 2024-01-03 NOTE — ED TRIAGE NOTES
The patient presents to the ED with chest pain and back pain that began this morning. The patient also reports a cough and shortness of breath. The patient reports his wife tested positive for influenza A. He denies a fever. History of hypertension and diabetes.

## 2024-01-04 ENCOUNTER — MYC MEDICAL ADVICE (OUTPATIENT)
Dept: FAMILY MEDICINE | Facility: CLINIC | Age: 44
End: 2024-01-04
Payer: COMMERCIAL

## 2024-01-11 ENCOUNTER — MYC MEDICAL ADVICE (OUTPATIENT)
Dept: FAMILY MEDICINE | Facility: CLINIC | Age: 44
End: 2024-01-11
Payer: COMMERCIAL

## 2024-01-11 DIAGNOSIS — E66.812 CLASS 2 SEVERE OBESITY DUE TO EXCESS CALORIES WITH SERIOUS COMORBIDITY AND BODY MASS INDEX (BMI) OF 39.0 TO 39.9 IN ADULT (H): ICD-10-CM

## 2024-01-11 DIAGNOSIS — K76.0 FATTY LIVER: ICD-10-CM

## 2024-01-11 DIAGNOSIS — E66.01 CLASS 2 SEVERE OBESITY DUE TO EXCESS CALORIES WITH SERIOUS COMORBIDITY AND BODY MASS INDEX (BMI) OF 39.0 TO 39.9 IN ADULT (H): ICD-10-CM

## 2024-01-11 DIAGNOSIS — I10 PRIMARY HYPERTENSION: ICD-10-CM

## 2024-01-11 DIAGNOSIS — E11.9 TYPE 2 DIABETES MELLITUS WITHOUT COMPLICATION, WITHOUT LONG-TERM CURRENT USE OF INSULIN (H): Primary | ICD-10-CM

## 2024-02-16 DIAGNOSIS — Z00.00 PHYSICAL EXAM, ANNUAL: ICD-10-CM

## 2024-02-16 RX ORDER — ASPIRIN 81 MG/1
81 TABLET ORAL DAILY
Qty: 100 TABLET | Refills: 3 | OUTPATIENT
Start: 2024-02-16

## 2024-02-16 NOTE — TELEPHONE ENCOUNTER
Medication Question or Refill        What medication are you calling about (include dose and sig)?: aspirin 81 MG EC tablet     Preferred Pharmacy:  [id number] - [place], [place] - [address]  [address]  [place] [address]  Phone: [phone] Fax: [phone]      Controlled Substance Agreement on file:   CSA -- Patient Level:    CSA: None found at the patient level.       Who prescribed the medication?: PCP    Do you need a refill? Yes    When did you use the medication last? N/A

## 2024-02-20 ENCOUNTER — PATIENT OUTREACH (OUTPATIENT)
Dept: CARE COORDINATION | Facility: CLINIC | Age: 44
End: 2024-02-20
Payer: COMMERCIAL

## 2024-03-01 DIAGNOSIS — Z00.00 PHYSICAL EXAM, ANNUAL: ICD-10-CM

## 2024-03-01 RX ORDER — ASPIRIN 81 MG/1
81 TABLET ORAL DAILY
Qty: 100 TABLET | Refills: 3 | OUTPATIENT
Start: 2024-03-01

## 2024-03-01 NOTE — TELEPHONE ENCOUNTER
Medication Question or Refill        What medication are you calling about (include dose and sig)?:   aspirin 81 MG EC tablet     Preferred Pharmacy:     [id number] - [place], [place] - [address]  [address]  [place] [address]  Phone: [phone] Fax: [phone]      Controlled Substance Agreement on file:   CSA -- Patient Level:    CSA: None found at the patient level.       Who prescribed the medication?: PCP    Do you need a refill? Yes    When did you use the medication last? NA    Patient offered an appointment? No    Do you have any questions or concerns?  No

## 2024-03-05 ENCOUNTER — PATIENT OUTREACH (OUTPATIENT)
Dept: CARE COORDINATION | Facility: CLINIC | Age: 44
End: 2024-03-05
Payer: COMMERCIAL

## 2024-03-19 ENCOUNTER — MYC REFILL (OUTPATIENT)
Dept: FAMILY MEDICINE | Facility: CLINIC | Age: 44
End: 2024-03-19
Payer: COMMERCIAL

## 2024-03-19 DIAGNOSIS — Z00.00 PHYSICAL EXAM, ANNUAL: ICD-10-CM

## 2024-03-19 DIAGNOSIS — N52.9 ERECTILE DYSFUNCTION, UNSPECIFIED ERECTILE DYSFUNCTION TYPE: ICD-10-CM

## 2024-03-19 DIAGNOSIS — E11.65 TYPE 2 DIABETES MELLITUS WITH HYPERGLYCEMIA, WITHOUT LONG-TERM CURRENT USE OF INSULIN (H): ICD-10-CM

## 2024-03-19 RX ORDER — SILDENAFIL 100 MG/1
100 TABLET, FILM COATED ORAL DAILY PRN
Qty: 10 TABLET | Refills: 1 | Status: SHIPPED | OUTPATIENT
Start: 2024-03-19

## 2024-03-19 RX ORDER — ASPIRIN 81 MG/1
81 TABLET ORAL DAILY
Qty: 100 TABLET | Refills: 1 | Status: SHIPPED | OUTPATIENT
Start: 2024-03-19 | End: 2024-08-29

## 2024-03-21 ENCOUNTER — MYC MEDICAL ADVICE (OUTPATIENT)
Dept: FAMILY MEDICINE | Facility: CLINIC | Age: 44
End: 2024-03-21

## 2024-03-21 DIAGNOSIS — Z20.2 EXPOSURE TO TRICHOMONAS: ICD-10-CM

## 2024-03-21 DIAGNOSIS — Z11.3 ROUTINE SCREENING FOR STI (SEXUALLY TRANSMITTED INFECTION): ICD-10-CM

## 2024-03-22 RX ORDER — METRONIDAZOLE 500 MG/1
2000 TABLET ORAL ONCE
Qty: 4 TABLET | Refills: 0 | Status: SHIPPED | OUTPATIENT
Start: 2024-03-22 | End: 2024-03-22

## 2024-04-16 ENCOUNTER — MYC REFILL (OUTPATIENT)
Dept: FAMILY MEDICINE | Facility: CLINIC | Age: 44
End: 2024-04-16
Payer: COMMERCIAL

## 2024-04-16 DIAGNOSIS — I10 ESSENTIAL HYPERTENSION: ICD-10-CM

## 2024-04-17 RX ORDER — METOPROLOL TARTRATE 50 MG
50 TABLET ORAL 2 TIMES DAILY
Qty: 180 TABLET | Refills: 1 | Status: SHIPPED | OUTPATIENT
Start: 2024-04-17

## 2024-04-17 NOTE — TELEPHONE ENCOUNTER
Prescription approved per [facility] Refill Protocol.  Lindsey Ly, RN  [facility] Triage Nurse

## 2024-05-12 ENCOUNTER — HEALTH MAINTENANCE LETTER (OUTPATIENT)
Age: 44
End: 2024-05-12

## 2024-05-29 ENCOUNTER — APPOINTMENT (OUTPATIENT)
Dept: ULTRASOUND IMAGING | Facility: CLINIC | Age: 44
End: 2024-05-29
Attending: EMERGENCY MEDICINE
Payer: COMMERCIAL

## 2024-05-29 ENCOUNTER — HOSPITAL ENCOUNTER (EMERGENCY)
Facility: CLINIC | Age: 44
Discharge: HOME | End: 2024-05-29
Attending: EMERGENCY MEDICINE | Admitting: EMERGENCY MEDICINE
Payer: COMMERCIAL

## 2024-05-29 VITALS
BODY MASS INDEX: 36.4 KG/M2 | RESPIRATION RATE: 18 BRPM | WEIGHT: 260 LBS | HEIGHT: 71 IN | OXYGEN SATURATION: 93 % | SYSTOLIC BLOOD PRESSURE: 122 MMHG | DIASTOLIC BLOOD PRESSURE: 62 MMHG | TEMPERATURE: 98.1 F | HEART RATE: 66 BPM

## 2024-05-29 DIAGNOSIS — N45.3 EPIDIDYMOORCHITIS: ICD-10-CM

## 2024-05-29 LAB
ALBUMIN UR-MCNC: NEGATIVE MG/DL
APPEARANCE UR: CLEAR
BILIRUB UR QL STRIP: NEGATIVE
C TRACH DNA SPEC QL PROBE+SIG AMP: NEGATIVE
COLOR UR AUTO: ABNORMAL
GLUCOSE UR STRIP-MCNC: >1000 MG/DL
HGB UR QL STRIP: NEGATIVE
KETONES UR STRIP-MCNC: NEGATIVE MG/DL
LEUKOCYTE ESTERASE UR QL STRIP: NEGATIVE
N GONORRHOEA DNA SPEC QL NAA+PROBE: NEGATIVE
NITRATE UR QL: NEGATIVE
PH UR STRIP: 5.5 [PH] (ref 5–7)
RBC URINE: 0 /HPF
SP GR UR STRIP: >1.03 (ref 1–1.03)
UROBILINOGEN UR STRIP-MCNC: <2 MG/DL
WBC URINE: 0 /HPF

## 2024-05-29 PROCEDURE — 250N000009 HC RX 250: Performed by: EMERGENCY MEDICINE

## 2024-05-29 PROCEDURE — 81001 URINALYSIS AUTO W/SCOPE: CPT | Performed by: EMERGENCY MEDICINE

## 2024-05-29 PROCEDURE — 87491 CHLMYD TRACH DNA AMP PROBE: CPT | Performed by: EMERGENCY MEDICINE

## 2024-05-29 PROCEDURE — 76870 US EXAM SCROTUM: CPT

## 2024-05-29 PROCEDURE — 99285 EMERGENCY DEPT VISIT HI MDM: CPT | Mod: 25

## 2024-05-29 PROCEDURE — 93976 VASCULAR STUDY: CPT

## 2024-05-29 PROCEDURE — 250N000011 HC RX IP 250 OP 636: Performed by: EMERGENCY MEDICINE

## 2024-05-29 PROCEDURE — 96372 THER/PROPH/DIAG INJ SC/IM: CPT | Performed by: EMERGENCY MEDICINE

## 2024-05-29 RX ORDER — DOXYCYCLINE 100 MG/1
100 CAPSULE ORAL 2 TIMES DAILY
Qty: 20 CAPSULE | Refills: 0 | Status: SHIPPED | OUTPATIENT
Start: 2024-05-29 | End: 2024-06-08

## 2024-05-29 RX ADMIN — LIDOCAINE HYDROCHLORIDE 500 MG: 10 INJECTION, SOLUTION EPIDURAL; INFILTRATION; INTRACAUDAL; PERINEURAL at 09:09

## 2024-05-29 ASSESSMENT — COLUMBIA-SUICIDE SEVERITY RATING SCALE - C-SSRS
2. HAVE YOU ACTUALLY HAD ANY THOUGHTS OF KILLING YOURSELF IN THE PAST MONTH?: NO
1. IN THE PAST MONTH, HAVE YOU WISHED YOU WERE DEAD OR WISHED YOU COULD GO TO SLEEP AND NOT WAKE UP?: NO
6. HAVE YOU EVER DONE ANYTHING, STARTED TO DO ANYTHING, OR PREPARED TO DO ANYTHING TO END YOUR LIFE?: NO

## 2024-05-29 ASSESSMENT — ACTIVITIES OF DAILY LIVING (ADL)
ADLS_ACUITY_SCORE: 35

## 2024-05-29 NOTE — ED PROVIDER NOTES
EMERGENCY DEPARTMENT ENCOUNTER      NAME: Chencho Mane Jr.  AGE: 43 year old male  YOB: 1980  MRN: [id number]  EVALUATION DATE & TIME: 5/29/2024  6:44 AM    PCP: Kat Serrato    ED PROVIDER: Rosa Elena Ma DO      Chief Complaint   Patient presents with    Testicular/scrotal Pain         FINAL IMPRESSION:  1. Epididymoorchitis          ED COURSE & MEDICAL DECISION MAKING:    Pertinent Labs & Imaging studies reviewed. (See chart for details)  6:52 AM I met the patient and performed my initial interview and exam.  6:55 AM I performed  exam with RN chaperone present.   8:28 AM I rechecked on the patient and updated them on results.  We discussed plans for discharge including supportive cares, symptomatic treatment, outpatient follow up, and reasons to return to the emergency department.      43 year old male presents to the Emergency Department for evaluation of right-sided testicular pain.  Symptoms started 1 day ago.  No known inciting event.  No trauma.  No dysuria.  Denies any concerns for any sexually transmitted infections.  No open wounds.  Tenderness mostly about his epididymis.  No palpable masses.  No inguinal hernia on exam.  Not consistent with torsion.  Of note, patient did have exposure to trichomonas 2 months ago by his wife, was treated however given this after discussion with patient will screen for gonorrhea and chlamydia.  These have been negative in the past (most recently March).  Urinalysis without evidence of infection.  Ultrasound does show right-sided epididymoorchitis.  Given recent exposure to a sexually transmitted infection, I do feel inclined to cover him with ceftriaxone and doxycycline.  We did discuss other symptomatic care for home such as elevation, ice, NSAIDs.  Encourage close return if any acute worsening of symptoms, fever, uncontrolled pain.  He should follow closely with his primary provider and urology.  We will call if gonorrhea or chlamydia are  positive    At the conclusion of the encounter I discussed the results of all of the tests and the disposition. The questions were answered. The patient or family acknowledged understanding and was agreeable with the care plan.     Medical Decision Making  Obtained supplemental history:Supplemental history obtained?: No  Reviewed external records: External records reviewed?: Documented in chart and Outpatient Record: urgency room on 3/21/2024  Care impacted by chronic illness:Diabetes and Hypertension  Care significantly affected by social determinants of health:Access to Medical Care  Did you consider but not order tests?: Work up considered but not performed and documented in chart, if applicable  Did you interpret images independently?: Independent interpretation of ECG and images noted in documentation, when applicable.  Consultation discussion with other provider:Did you involve another provider (consultant, , pharmacy, etc.)?: No  Discharge. I prescribed additional prescription strength medication(s) as charted. See documentation for any additional details.    MEDICATIONS GIVEN IN THE EMERGENCY:  Medications   cefTRIAXone (ROCEPHIN) 500 mg in lidocaine injection (has no administration in time range)       NEW PRESCRIPTIONS STARTED AT TODAY'S ER VISIT  New Prescriptions    DOXYCYCLINE HYCLATE (VIBRAMYCIN) 100 MG CAPSULE    Take 1 capsule (100 mg) by mouth 2 times daily for 10 days          =================================================================    HPI    Patient information was obtained from: patient    Use of : N/A         Chencho Mane Jr. is a 43 year old male with a pertinent history of HTN, HLD, and type 2 diabetes who presents to this ED via private car for evaluation of testicular pain.     The patient reports constant right-sided testicular pain since yesterday morning. He denies any preceding injuries. He also has some mild right lower abdominal pain. He hasn't taken any pain  medications prior to arrival. No associated rashes, sores, dysuria, fevers, or any other complaints at this time. He denies any concern for STDs; however, he notes his partner recently had a UTI. He takes medication for high blood pressure and cholesterol, and he is otherwise healthy.    Per chart review, patient was seen in the [place] urgency room on 3/21/2024 for STD exposure. He was treated with 7-day course of Flagyl.      REVIEW OF SYSTEMS   Per HPI    PAST MEDICAL HISTORY:  Past Medical History:   Diagnosis Date    Hypertension     Palpitations     Sleep apnea        PAST SURGICAL HISTORY:  Past Surgical History:   Procedure Laterality Date    ANGIOGRAM      COLONOSCOPY N/A 9/22/2023    Procedure: Colonoscopy;  Surgeon: Hubert Walter MD;  Location: [facility]    HERNIA REPAIR      age 4           CURRENT MEDICATIONS:    doxycycline hyclate (VIBRAMYCIN) 100 MG capsule  aspirin 81 MG EC tablet  bisacodyl (DULCOLAX) 5 MG EC tablet  citalopram (CELEXA) 40 MG tablet  empagliflozin (JARDIANCE) 10 MG TABS tablet  hydrochlorothiazide (HYDRODIURIL) 25 MG tablet  hydrOXYzine HCl (ATARAX) 25 MG tablet  hydrOXYzine HCl (ATARAX) 25 MG tablet  lisinopril (ZESTRIL) 10 MG tablet  metoprolol tartrate (LOPRESSOR) 50 MG tablet  multivitamin w/minerals (THERA-VIT-M) tablet  naproxen (NAPROSYN) 250 MG tablet  polyethylene glycol (GOLYTELY) 236 g suspension  semaglutide (OZEMPIC) 2 MG/3ML pen  semaglutide (OZEMPIC) 2 MG/3ML pen  Semaglutide, 1 MG/DOSE, (OZEMPIC) 4 MG/3ML pen  Semaglutide-Weight Management (WEGOVY) 0.25 MG/0.5ML pen  Semaglutide-Weight Management (WEGOVY) 0.5 MG/0.5ML pen  Semaglutide-Weight Management (WEGOVY) 1 MG/0.5ML pen  sildenafil (VIAGRA) 100 MG tablet  simvastatin (ZOCOR) 40 MG tablet         ALLERGIES:  No Known Allergies    FAMILY HISTORY:  History reviewed. No pertinent family history.    SOCIAL HISTORY:   Social History     Socioeconomic History    Marital status:    Tobacco Use    Smoking  status: Never     Passive exposure: Past    Smokeless tobacco: Never   Vaping Use    Vaping status: Never Used   Substance and Sexual Activity    Alcohol use: Yes     Comment: socially    Drug use: Never     Social Determinants of Health     Financial Resource Strain: Low Risk  (12/13/2023)    Financial Resource Strain     Within the past 12 months, have you or your family members you live with been unable to get utilities (heat, electricity) when it was really needed?: No   Food Insecurity: Low Risk  (12/13/2023)    Food Insecurity     Within the past 12 months, did you worry that your food would run out before you got money to buy more?: No     Within the past 12 months, did the food you bought just not last and you didn t have money to get more?: No   Transportation Needs: Low Risk  (12/13/2023)    Transportation Needs     Within the past 12 months, has lack of transportation kept you from medical appointments, getting your medicines, non-medical meetings or appointments, work, or from getting things that you need?: No   Physical Activity: Not on File (11/26/2021)    Received from [employer]     Physical Activity     Physical Activity: 0   Stress: Not on File (11/26/2021)    Received from [employer]     Stress     Stress: 0    [facility]    Social Connections   Interpersonal Safety: Low Risk  (12/20/2023)    Interpersonal Safety     Do you feel physically and emotionally safe where you currently live?: Yes     Within the past 12 months, have you been hit, slapped, kicked or otherwise physically hurt by someone?: No     Within the past 12 months, have you been humiliated or emotionally abused in other ways by your partner or ex-partner?: No   Housing Stability: Low Risk  (12/13/2023)    Housing Stability     Do you have housing? : Yes     Are you worried about losing your housing?: No       VITALS:  BP (!) 144/79    "Pulse 72   Temp 98.1  F (36.7  C) (Oral)   Resp 18   Ht 1.803 m (5' 11\")   Wt 117.9 kg (260 lb)   SpO2 95%   BMI 36.26 kg/m      PHYSICAL EXAM    Physical Exam  Vitals and nursing note reviewed. Exam conducted with a chaperone present.   Constitutional:       General: He is not in acute distress.     Appearance: Normal appearance.   HENT:      Head: Normocephalic and atraumatic.   Eyes:      Pupils: Pupils are equal, round, and reactive to light.   Cardiovascular:      Rate and Rhythm: Normal rate and regular rhythm.   Pulmonary:      Effort: Pulmonary effort is normal.   Abdominal:      General: Abdomen is flat. Bowel sounds are normal.      Tenderness: There is no abdominal tenderness.      Hernia: There is no hernia in the right inguinal area.   Genitourinary:     Penis: Normal.       Testes:         Right: Tenderness present. Mass or swelling not present. Right testis is descended.      Epididymis:      Right: Tenderness present.      Left: Normal.   Musculoskeletal:         General: Normal range of motion.   Skin:     General: Skin is warm and dry.   Neurological:      General: No focal deficit present.      Mental Status: He is alert.      Gait: Gait normal.           LAB:  All pertinent labs reviewed and interpreted.  Labs Ordered and Resulted from Time of ED Arrival to Time of ED Departure   ROUTINE UA WITH MICROSCOPIC REFLEX TO CULTURE - Abnormal       Result Value    Color Urine Light Yellow      Appearance Urine Clear      Glucose Urine >1000 (*)     Bilirubin Urine Negative      Ketones Urine Negative      Specific Gravity Urine >1.030      Blood Urine Negative      pH Urine 5.5      Protein Albumin Urine Negative      Urobilinogen Urine <2.0      Nitrite Urine Negative      Leukocyte Esterase Urine Negative      RBC Urine 0      WBC Urine 0     CHLAMYDIA TRACHOMATIS/NEISSERIA GONORRHOEAE BY PCR       RADIOLOGY:  Reviewed all pertinent imaging. Please see official radiology report.  US Testicular & " Scrotum w Doppler Ltd   Final Result   IMPRESSION:   1.  Right epididymoorchitis.   2.  Small bilateral hydroceles.          I, Milly Gtz, am serving as a scribe to document services personally performed by Dr. Rosa Elena Ma based on my observation and the provider's statements to me. I, Rosa Elena Ma, DO attest that Milly Gtz is acting in a scribe capacity, has observed my performance of the services and has documented them in accordance with my direction.    Rosa Elena Ma DO  Emergency Medicine  [facility] EMERGENCY ROOM  [address] [address]  [phone]  Dept: [phone]       Rosa Elena Ma DO  05/29/24 0901

## 2024-05-29 NOTE — ED TRIAGE NOTES
Pt presents to ED with c/o right testicle pain that started yesterday morning.  Notes similar pain about 1 year ago after having vasectomy.  Denies any urinary sx or discharge.  No medications PTA.     Triage Assessment (Adult)       Row Name 05/29/24 0645          Triage Assessment    Airway WDL WDL        Respiratory WDL    Respiratory WDL WDL        Skin Circulation/Temperature WDL    Skin Circulation/Temperature WDL WDL        Cardiac WDL    Cardiac WDL WDL        Peripheral/Neurovascular WDL    Peripheral Neurovascular WDL WDL        Cognitive/Neuro/Behavioral WDL    Cognitive/Neuro/Behavioral WDL WDL

## 2024-05-29 NOTE — DISCHARGE INSTRUCTIONS
As discussed, your ultrasound did show epididymitis  This is an inflammation of your epididymis and the surrounding testicle  Sometimes we do not always know the cause.  Always a concern that bacteria either seeded from other surrounding structures or from a sexually transmitted infection  Sometimes trauma or injury can cause this as well  You are given a dose of ceftriaxone here today and started on doxycycline twice daily for 10 days  Complete this medication as prescribed  I would recommend elevation of the testicles, ice, anti-inflammatory medication such as ibuprofen or Aleve to help as well  We will call if your gonorrhea or chlamydia testing are positive  I would recommend close follow up with your primary doctor and urology  Please return if any increasing pain or redness or fever, we may have to repeat imaging or change your antibiotic.

## 2024-05-31 ENCOUNTER — PATIENT OUTREACH (OUTPATIENT)
Dept: CARE COORDINATION | Facility: CLINIC | Age: 44
End: 2024-05-31

## 2024-05-31 NOTE — PROGRESS NOTES
[facility]  Community Health Worker Initial Outreach    CHW Initial Information Gathering:  Referral Source: ED Follow-Up  CHW Additional Questions  If ED/Hospital discharge, follow-up appointment scheduled as recommended?: Other (Pt has an office visit with his PCP on 06/21/2024. Pt is okay with keeping this appointment and declined to schedule sooner appointment for ED follow-up.)  MyChart active?: Yes    Patient accepts CC: No, patient declined at this time. Patient will be sent Care Coordination introduction letter for future reference.       Nicole Eckert  Community Health Worker  [facility] Care Resource [place], [facility]    *Connected Care Resource Team does NOT follow patient ongoing. Referrals are identified based on internal discharge reports and the outreach is to ensure patient has an understanding of their discharge instructions.

## 2024-06-13 ENCOUNTER — MYC REFILL (OUTPATIENT)
Dept: FAMILY MEDICINE | Facility: CLINIC | Age: 44
End: 2024-06-13
Payer: COMMERCIAL

## 2024-06-13 DIAGNOSIS — I10 ESSENTIAL HYPERTENSION: ICD-10-CM

## 2024-06-13 DIAGNOSIS — F41.0 PANIC ATTACK: ICD-10-CM

## 2024-06-13 DIAGNOSIS — Z00.00 PHYSICAL EXAM, ANNUAL: ICD-10-CM

## 2024-06-13 DIAGNOSIS — F41.9 ANXIETY: ICD-10-CM

## 2024-06-14 ENCOUNTER — MYC MEDICAL ADVICE (OUTPATIENT)
Dept: FAMILY MEDICINE | Facility: CLINIC | Age: 44
End: 2024-06-14
Payer: COMMERCIAL

## 2024-06-14 RX ORDER — ASPIRIN 81 MG/1
81 TABLET ORAL DAILY
Qty: 100 TABLET | Refills: 1 | OUTPATIENT
Start: 2024-06-14

## 2024-06-14 RX ORDER — SIMVASTATIN 40 MG
40 TABLET ORAL DAILY
Qty: 90 TABLET | Refills: 3 | Status: SHIPPED | OUTPATIENT
Start: 2024-06-14

## 2024-06-14 RX ORDER — METOPROLOL TARTRATE 50 MG
50 TABLET ORAL 2 TIMES DAILY
Qty: 180 TABLET | Refills: 1 | OUTPATIENT
Start: 2024-06-14

## 2024-06-14 RX ORDER — HYDROXYZINE HYDROCHLORIDE 25 MG/1
25 TABLET, FILM COATED ORAL 3 TIMES DAILY PRN
Qty: 30 TABLET | Refills: 2 | Status: SHIPPED | OUTPATIENT
Start: 2024-06-14

## 2024-06-14 RX ORDER — LISINOPRIL 10 MG/1
10 TABLET ORAL DAILY
Qty: 90 TABLET | Refills: 3 | Status: SHIPPED | OUTPATIENT
Start: 2024-06-14

## 2024-06-14 RX ORDER — CITALOPRAM HYDROBROMIDE 40 MG/1
40 TABLET ORAL DAILY
Qty: 90 TABLET | Refills: 0 | Status: SHIPPED | OUTPATIENT
Start: 2024-06-14 | End: 2024-08-12

## 2024-06-14 RX ORDER — HYDROCHLOROTHIAZIDE 25 MG/1
25 TABLET ORAL DAILY
Qty: 90 TABLET | Refills: 0 | Status: SHIPPED | OUTPATIENT
Start: 2024-06-14 | End: 2024-08-12

## 2024-06-21 ENCOUNTER — OFFICE VISIT (OUTPATIENT)
Dept: FAMILY MEDICINE | Facility: CLINIC | Age: 44
End: 2024-06-21
Payer: COMMERCIAL

## 2024-06-21 VITALS
TEMPERATURE: 97.9 F | BODY MASS INDEX: 37.52 KG/M2 | OXYGEN SATURATION: 97 % | SYSTOLIC BLOOD PRESSURE: 113 MMHG | RESPIRATION RATE: 16 BRPM | WEIGHT: 269 LBS | HEART RATE: 70 BPM | DIASTOLIC BLOOD PRESSURE: 72 MMHG

## 2024-06-21 DIAGNOSIS — E66.812 CLASS 2 SEVERE OBESITY DUE TO EXCESS CALORIES WITH SERIOUS COMORBIDITY AND BODY MASS INDEX (BMI) OF 38.0 TO 38.9 IN ADULT (H): ICD-10-CM

## 2024-06-21 DIAGNOSIS — F41.8 DEPRESSION WITH ANXIETY: ICD-10-CM

## 2024-06-21 DIAGNOSIS — I10 PRIMARY HYPERTENSION: ICD-10-CM

## 2024-06-21 DIAGNOSIS — E66.01 CLASS 2 SEVERE OBESITY DUE TO EXCESS CALORIES WITH SERIOUS COMORBIDITY AND BODY MASS INDEX (BMI) OF 38.0 TO 38.9 IN ADULT (H): ICD-10-CM

## 2024-06-21 DIAGNOSIS — E11.9 TYPE 2 DIABETES MELLITUS WITHOUT COMPLICATION, WITHOUT LONG-TERM CURRENT USE OF INSULIN (H): Primary | ICD-10-CM

## 2024-06-21 LAB — HBA1C MFR BLD: 7.6 % (ref 0–5.6)

## 2024-06-21 PROCEDURE — 83036 HEMOGLOBIN GLYCOSYLATED A1C: CPT | Performed by: PHYSICIAN ASSISTANT

## 2024-06-21 PROCEDURE — 80061 LIPID PANEL: CPT | Performed by: PHYSICIAN ASSISTANT

## 2024-06-21 PROCEDURE — 82570 ASSAY OF URINE CREATININE: CPT | Performed by: PHYSICIAN ASSISTANT

## 2024-06-21 PROCEDURE — 99214 OFFICE O/P EST MOD 30 MIN: CPT | Performed by: PHYSICIAN ASSISTANT

## 2024-06-21 PROCEDURE — G2211 COMPLEX E/M VISIT ADD ON: HCPCS | Performed by: PHYSICIAN ASSISTANT

## 2024-06-21 PROCEDURE — 36415 COLL VENOUS BLD VENIPUNCTURE: CPT | Performed by: PHYSICIAN ASSISTANT

## 2024-06-21 PROCEDURE — 82043 UR ALBUMIN QUANTITATIVE: CPT | Performed by: PHYSICIAN ASSISTANT

## 2024-06-21 NOTE — PROGRESS NOTES
"  Assessment & Plan     Type 2 diabetes mellitus without complication, without long-term current use of insulin (H)  -increase Jardiance to 25mg  -continue metformin  -Healthy diet and exercise reviewed.  -plan to follow-up in 3 months  - Adult Eye  Referral; Future  - HEMOGLOBIN A1C; Future  - Lipid panel reflex to direct LDL Non-fasting; Future  - Albumin Random Urine Quantitative with Creat Ratio; Future  - HEMOGLOBIN A1C  - Lipid panel reflex to direct LDL Non-fasting  - Albumin Random Urine Quantitative with Creat Ratio  - empagliflozin (JARDIANCE) 25 MG TABS tablet; Take 1 tablet (25 mg) by mouth daily  - atorvastatin (LIPITOR) 40 MG tablet; Take 1 tablet (40 mg) by mouth daily    Primary hypertension  -great control     Class 2 severe obesity due to excess calories with seriouscomorbidity and body mass index (BMI) of 38.0 to 38.9 in adult (H)  - empagliflozin (JARDIANCE) 25 MG TABS tablet; Take 1 tablet (25 mg) by mouth daily    Depression with anxiety  -stable on citalopram    BMI  Estimated body mass index is 37.52 kg/m  as calculated from the following:    Height as of 5/29/24: 1.803 m (5' 11\").    Weight as of this encounter: 122 kg (269 lb).       The longitudinal plan of care for the diagnosis(es)/condition(s) as documented were addressed during this visit. Due to the added complexity in care, I will continue to support Chencho in the subsequent management and with ongoing continuity of care.       Subjective   Chencho is a 43 year old, presenting for the following health issues:  Follow Up      6/21/2024     2:50 PM   Additional Questions   Roomed by Neida COREY   Accompanied by self     History of Present Illness       Diabetes:   He presents for follow up of diabetes.    He is not checking blood glucose.         He has no concerns regarding his diabetes at this time.   He is not experiencing numbness or burning in feet, excessive thirst, blurry vision, weight changes or redness, sores or blisters on " "feet. The patient has not had a diabetic eye exam in the last 12 months.          Hyperlipidemia:  He presents for follow up of hyperlipidemia.   He is taking medication to lower cholesterol. He is not having myalgia or other side effects to statin medications.    Hypertension: He presents for follow up of hypertension.  He does not check blood pressure  regularly outside of the clinic. Outpatient blood pressures have not been over 140/90. He does not follow a low salt diet.     He eats 2-3 servings of fruits and vegetables daily.He consumes 0 sweetened beverage(s) daily.He exercises with enough effort to increase his heart rate 20 to 29 minutes per day.  He exercises with enough effort to increase his heart rate 3 or less days per week.   He is taking medications regularly.     -here for follow-up   h/o T2DM and HTN, HLD.  Typically managed well  -taking Jardiance 10mg and Metformin  -tries to exercise (lifting) some diet is \"not great\"    -Bps managed wel  -Patient denies any exertional chest pain, dyspnea, palpitations, syncope, orthopnea, edema or paroxysmal nocturnal dyspnea.     -anxiety is controlled without panic.  Feeling good.          Objective    /72 (BP Location: Left arm, Patient Position: Sitting, Cuff Size: Thigh)   Pulse 70   Temp 97.9  F (36.6  C) (Temporal)   Resp 16   Wt 122 kg (269 lb)   SpO2 97%   BMI 37.52 kg/m    Body mass index is 37.52 kg/m .  Physical Exam   GENERAL: alert and no distress  EYES: Eyes grossly normal to inspection, PERRL and conjunctivae and sclerae normal  HENT: ear canals and TM's normal, nose and mouth without ulcers or lesions  NECK: no adenopathy, no asymmetry, masses, or scars  RESP: lungs clear to auscultation - no rales, rhonchi or wheezes  CV: regular rate and rhythm, normal S1 S2, no S3 or S4, no murmur, click or rub, no peripheral edema  ABDOMEN: soft, nontender, no hepatosplenomegaly, no masses and bowel sounds normal  MS: no gross musculoskeletal " defects noted, no edema  PSYCH: mentation appears normal, affect normal/bright  Diabetic foot exam: normal DP and PT pulses, no trophic changes or ulcerative lesions, and normal sensory exam    Results for orders placed or performed in visit on 06/21/24   HEMOGLOBIN A1C     Status: Abnormal   Result Value Ref Range    Hemoglobin A1C 7.6 (H) 0.0 - 5.6 %   Lipid panel reflex to direct LDL Non-fasting     Status: Abnormal   Result Value Ref Range    Cholesterol 151 <200 mg/dL    Triglycerides 236 (H) <150 mg/dL    Direct Measure HDL 28 (L) >=40 mg/dL    LDL Cholesterol Calculated 76 <=100 mg/dL    Non HDL Cholesterol 123 <130 mg/dL    Patient Fasting > 8hrs? No     Narrative    Cholesterol  Desirable:  <200 mg/dL    Triglycerides  Normal:  Less than 150 mg/dL  Borderline High:  150-199 mg/dL  High:  200-499 mg/dL  Very High:  Greater than or equal to 500 mg/dL    Direct Measure HDL  Female:  Greater than or equal to 50 mg/dL   Male:  Greater than or equal to 40 mg/dL    LDL Cholesterol  Desirable:  <100mg/dL  Above Desirable:  100-129 mg/dL   Borderline High:  130-159 mg/dL   High:  160-189 mg/dL   Very High:  >= 190 mg/dL    Non HDL Cholesterol  Desirable:  130 mg/dL  Above Desirable:  130-159 mg/dL  Borderline High:  160-189 mg/dL  High:  190-219 mg/dL  Very High:  Greater than or equal to 220 mg/dL   Albumin Random Urine Quantitative with Creat Ratio     Status: None   Result Value Ref Range    Creatinine Urine mg/dL 169.0 mg/dL    Albumin Urine mg/L <12.0 mg/L    Albumin Urine mg/g Cr             Signed Electronically by: Kat Serrato PA-C      Prior to immunization administration, verified patients identity using patient s name and date of birth. Please see Immunization Activity for additional information.     Screening Questionnaire for Adult Immunization    Are you sick today?   No   Do you have allergies to medications, food, a vaccine component or latex?   No   Have you ever had a serious reaction  after receiving a vaccination?   No   Do you have a long-term health problem with heart, lung, kidney, or metabolic disease (e.g., diabetes), asthma, a blood disorder, no spleen, complement component deficiency, a cochlear implant, or a spinal fluid leak?  Are you on long-term aspirin therapy?   Yes   Do you have cancer, leukemia, HIV/AIDS, or any other immune system problem?   No   Do you have a parent, brother, or sister with an immune system problem?   No   In the past 3 months, have you taken medications that affect  your immune system, such as prednisone, other steroids, or anticancer drugs; drugs for the treatment of rheumatoid arthritis, Crohn s disease, or psoriasis; or have you had radiation treatments?   No   Have you had a seizure, or a brain or other nervous system problem?   No   During the past year, have you received a transfusion of blood or blood    products, or been given immune (gamma) globulin or antiviral drug?   No   For women: Are you pregnant or is there a chance you could become       pregnant during the next month?   No   Have you received any vaccinations in the past 4 weeks?   No     Immunization questionnaire was positive for at least one answer.  Notified provider.      Patient instructed to remain in clinic for 15 minutes afterwards, and to report any adverse reactions.     Screening performed by Neida Garner MA on 6/21/2024 at 2:54 PM.

## 2024-06-22 LAB
CHOLEST SERPL-MCNC: 151 MG/DL
CREAT UR-MCNC: 169 MG/DL
FASTING STATUS PATIENT QL REPORTED: NO
HDLC SERPL-MCNC: 28 MG/DL
LDLC SERPL CALC-MCNC: 76 MG/DL
MICROALBUMIN UR-MCNC: <12 MG/L
MICROALBUMIN/CREAT UR: NORMAL MG/G{CREAT}
NONHDLC SERPL-MCNC: 123 MG/DL
TRIGL SERPL-MCNC: 236 MG/DL

## 2024-06-24 RX ORDER — ATORVASTATIN CALCIUM 40 MG/1
40 TABLET, FILM COATED ORAL DAILY
Qty: 90 TABLET | Refills: 1 | Status: SHIPPED | OUTPATIENT
Start: 2024-06-24

## 2024-06-24 NOTE — RESULT ENCOUNTER NOTE
Davi Paiz ,     Here are your recent results. Your cholesterol looks ok but your triglycerides are too high.  Lowering  the amount of sugar ,alcohol and sweets in the diet helps to control this.Exercise and weight loss helps as well.      I would like to switch from simvastatin to atorvastatin to see if this works better for you.  I will send this to your pharmacy.  Let's recheck cholesterol when you are back here in the fall to recheck on things.      Please let me know if you have any questions or concerns.    Take care!   Kat Serrato PA-C on 4/11/2023 at 9:00 AM

## 2024-08-10 DIAGNOSIS — I10 ESSENTIAL HYPERTENSION: ICD-10-CM

## 2024-08-10 DIAGNOSIS — F41.9 ANXIETY: ICD-10-CM

## 2024-08-10 DIAGNOSIS — Z00.00 PHYSICAL EXAM, ANNUAL: ICD-10-CM

## 2024-08-12 RX ORDER — HYDROCHLOROTHIAZIDE 25 MG/1
25 TABLET ORAL DAILY
Qty: 90 TABLET | Refills: 2 | Status: SHIPPED | OUTPATIENT
Start: 2024-08-12

## 2024-08-12 RX ORDER — CITALOPRAM HYDROBROMIDE 40 MG/1
40 TABLET ORAL DAILY
Qty: 90 TABLET | Refills: 0 | Status: SHIPPED | OUTPATIENT
Start: 2024-08-12

## 2024-08-12 RX ORDER — ASPIRIN 81 MG/1
81 TABLET, COATED ORAL DAILY
Qty: 100 TABLET | Refills: 0 | OUTPATIENT
Start: 2024-08-12

## 2024-08-19 ENCOUNTER — PATIENT OUTREACH (OUTPATIENT)
Dept: CARE COORDINATION | Facility: CLINIC | Age: 44
End: 2024-08-19
Payer: COMMERCIAL

## 2024-08-29 DIAGNOSIS — Z00.00 PHYSICAL EXAM, ANNUAL: ICD-10-CM

## 2024-08-29 RX ORDER — ASPIRIN 81 MG/1
81 TABLET, COATED ORAL DAILY
Qty: 100 TABLET | Refills: 2 | Status: SHIPPED | OUTPATIENT
Start: 2024-08-29

## 2024-09-17 ENCOUNTER — PATIENT OUTREACH (OUTPATIENT)
Dept: CARE COORDINATION | Facility: CLINIC | Age: 44
End: 2024-09-17
Payer: COMMERCIAL

## 2024-09-18 ENCOUNTER — MYC REFILL (OUTPATIENT)
Dept: FAMILY MEDICINE | Facility: CLINIC | Age: 44
End: 2024-09-18
Payer: COMMERCIAL

## 2024-09-18 DIAGNOSIS — Z00.00 PHYSICAL EXAM, ANNUAL: ICD-10-CM

## 2024-09-18 RX ORDER — ASPIRIN 81 MG/1
81 TABLET ORAL DAILY
Qty: 100 TABLET | Refills: 2 | OUTPATIENT
Start: 2024-09-18

## 2024-09-29 ENCOUNTER — HEALTH MAINTENANCE LETTER (OUTPATIENT)
Age: 44
End: 2024-09-29

## 2024-10-18 ENCOUNTER — TRANSFERRED RECORDS (OUTPATIENT)
Dept: MULTI SPECIALTY CLINIC | Facility: CLINIC | Age: 44
End: 2024-10-18
Payer: COMMERCIAL

## 2024-10-18 LAB — RETINOPATHY: NORMAL

## 2024-11-01 ENCOUNTER — OFFICE VISIT (OUTPATIENT)
Dept: FAMILY MEDICINE | Facility: CLINIC | Age: 44
End: 2024-11-01
Payer: COMMERCIAL

## 2024-11-01 VITALS
HEIGHT: 71 IN | HEART RATE: 67 BPM | SYSTOLIC BLOOD PRESSURE: 128 MMHG | BODY MASS INDEX: 36.68 KG/M2 | TEMPERATURE: 98.2 F | WEIGHT: 262 LBS | RESPIRATION RATE: 21 BRPM | OXYGEN SATURATION: 96 % | DIASTOLIC BLOOD PRESSURE: 79 MMHG

## 2024-11-01 DIAGNOSIS — Z11.3 ROUTINE SCREENING FOR STI (SEXUALLY TRANSMITTED INFECTION): ICD-10-CM

## 2024-11-01 DIAGNOSIS — I10 ESSENTIAL HYPERTENSION: ICD-10-CM

## 2024-11-01 DIAGNOSIS — Z11.4 SCREENING FOR HIV (HUMAN IMMUNODEFICIENCY VIRUS): Primary | ICD-10-CM

## 2024-11-01 DIAGNOSIS — N52.9 ERECTILE DYSFUNCTION, UNSPECIFIED ERECTILE DYSFUNCTION TYPE: ICD-10-CM

## 2024-11-01 DIAGNOSIS — Z11.59 NEED FOR HEPATITIS C SCREENING TEST: ICD-10-CM

## 2024-11-01 DIAGNOSIS — E11.9 TYPE 2 DIABETES MELLITUS WITHOUT COMPLICATION, WITHOUT LONG-TERM CURRENT USE OF INSULIN (H): ICD-10-CM

## 2024-11-01 DIAGNOSIS — F41.9 ANXIETY: ICD-10-CM

## 2024-11-01 DIAGNOSIS — E66.812 CLASS 2 SEVERE OBESITY DUE TO EXCESS CALORIES WITH SERIOUS COMORBIDITY AND BODY MASS INDEX (BMI) OF 38.0 TO 38.9 IN ADULT (H): ICD-10-CM

## 2024-11-01 DIAGNOSIS — E66.01 CLASS 2 SEVERE OBESITY DUE TO EXCESS CALORIES WITH SERIOUS COMORBIDITY AND BODY MASS INDEX (BMI) OF 38.0 TO 38.9 IN ADULT (H): ICD-10-CM

## 2024-11-01 LAB
EST. AVERAGE GLUCOSE BLD GHB EST-MCNC: 166 MG/DL
HBA1C MFR BLD: 7.4 % (ref 0–5.6)

## 2024-11-01 PROCEDURE — 99214 OFFICE O/P EST MOD 30 MIN: CPT | Performed by: PHYSICIAN ASSISTANT

## 2024-11-01 PROCEDURE — 90480 ADMN SARSCOV2 VAC 1/ONLY CMP: CPT | Performed by: PHYSICIAN ASSISTANT

## 2024-11-01 PROCEDURE — 36415 COLL VENOUS BLD VENIPUNCTURE: CPT | Performed by: PHYSICIAN ASSISTANT

## 2024-11-01 PROCEDURE — 90656 IIV3 VACC NO PRSV 0.5 ML IM: CPT | Performed by: PHYSICIAN ASSISTANT

## 2024-11-01 PROCEDURE — G2211 COMPLEX E/M VISIT ADD ON: HCPCS | Performed by: PHYSICIAN ASSISTANT

## 2024-11-01 PROCEDURE — 80061 LIPID PANEL: CPT | Performed by: PHYSICIAN ASSISTANT

## 2024-11-01 PROCEDURE — 83036 HEMOGLOBIN GLYCOSYLATED A1C: CPT | Performed by: PHYSICIAN ASSISTANT

## 2024-11-01 PROCEDURE — 90471 IMMUNIZATION ADMIN: CPT | Performed by: PHYSICIAN ASSISTANT

## 2024-11-01 PROCEDURE — 91320 SARSCV2 VAC 30MCG TRS-SUC IM: CPT | Performed by: PHYSICIAN ASSISTANT

## 2024-11-01 RX ORDER — ATORVASTATIN CALCIUM 40 MG/1
40 TABLET, FILM COATED ORAL DAILY
Qty: 90 TABLET | Refills: 1 | Status: SHIPPED | OUTPATIENT
Start: 2024-11-01

## 2024-11-01 RX ORDER — CITALOPRAM HYDROBROMIDE 40 MG/1
40 TABLET ORAL DAILY
Qty: 90 TABLET | Refills: 0 | Status: SHIPPED | OUTPATIENT
Start: 2024-11-01

## 2024-11-01 RX ORDER — LISINOPRIL 10 MG/1
10 TABLET ORAL DAILY
Qty: 90 TABLET | Refills: 3 | Status: SHIPPED | OUTPATIENT
Start: 2024-11-01

## 2024-11-01 RX ORDER — SILDENAFIL 100 MG/1
100 TABLET, FILM COATED ORAL DAILY PRN
Qty: 10 TABLET | Refills: 1 | Status: SHIPPED | OUTPATIENT
Start: 2024-11-01

## 2024-11-01 RX ORDER — METOPROLOL TARTRATE 50 MG
50 TABLET ORAL 2 TIMES DAILY
Qty: 180 TABLET | Refills: 1 | Status: SHIPPED | OUTPATIENT
Start: 2024-11-01

## 2024-11-01 NOTE — PROGRESS NOTES
"Wt Readings from Last 3 Encounters:   11/01/24 118.8 kg (262 lb)   06/21/24 122 kg (269 lb)   05/29/24 117.9 kg (260 lb)        Assessment & Plan     Type 2 diabetes mellitus without complication, without long-term current use of insulin (H)  -A1C looks ok, will recheck in 3 months   -continue current meds  - HEMOGLOBIN A1C; Future  - HEMOGLOBIN A1C  - atorvastatin (LIPITOR) 40 MG tablet; Take 1 tablet (40 mg) by mouth daily.  - empagliflozin (JARDIANCE) 25 MG TABS tablet; Take 1 tablet (25 mg) by mouth daily.    Anxiety  -stable  - citalopram (CELEXA) 40 MG tablet; Take 1 tablet (40 mg) by mouth daily.    Class 2 severe obesity due to excess calories with serious comorbidity and body mass index (BMI) of 38.0 to 38.9 in adult (H)  -he will work on weight loss.  Will monitor  - Lipid Profile (Chol, Trig, HDL, LDL calc); Future  - empagliflozin (JARDIANCE) 25 MG TABS tablet; Take 1 tablet (25 mg) by mouth daily.  - Lipid Profile (Chol, Trig, HDL, LDL calc)    Essential hypertension  -good control, will monitor  - lisinopril (ZESTRIL) 10 MG tablet; Take 1 tablet (10 mg) by mouth daily.  - metoprolol tartrate (LOPRESSOR) 50 MG tablet; Take 1 tablet (50 mg) by mouth 2 times daily.    Erectile dysfunction, unspecified erectile dysfunction type  - sildenafil (VIAGRA) 100 MG tablet; Take 1 tablet (100 mg) by mouth daily as needed (sexual activity).          BMI  Estimated body mass index is 36.68 kg/m  as calculated from the following:    Height as of this encounter: 1.8 m (5' 10.87\").    Weight as of this encounter: 118.8 kg (262 lb).     The longitudinal plan of care for the diagnosis(es)/condition(s) as documented were addressed during this visit. Due to the added complexity in care, I will continue to support Chencho in the subsequent management and with ongoing continuity of care.         Subjective   Chencho is a 44 year old, presenting for the following health issues:  Diabetes      11/1/2024     3:48 PM   Additional " "Questions   Roomed by Christine   Accompanied by self     Via the Health Maintenance questionnaire, the patient has reported the following services have been completed -Eye Exam: [facility] 2024-10-18, this information has been sent to the abstraction team.  History of Present Illness       Diabetes:   He presents for follow up of diabetes.    He is not checking blood glucose.         He has no concerns regarding his diabetes at this time.   He is not experiencing numbness or burning in feet, excessive thirst, blurry vision, weight changes or redness, sores or blisters on feet. The patient has had a diabetic eye exam in the last 12 months. Eye exam performed on 10-17-24. Location of last eye exam [facility].        Hyperlipidemia:  He presents for follow up of hyperlipidemia.   He is taking medication to lower cholesterol. He is not having myalgia or other side effects to statin medications.    Hypertension: He presents for follow up of hypertension.  He does not check blood pressure  regularly outside of the clinic. Outpatient blood pressures have not been over 140/90. He does not follow a low salt diet.     He eats 2-3 servings of fruits and vegetables daily.He consumes 0 sweetened beverage(s) daily.He exercises with enough effort to increase his heart rate 20 to 29 minutes per day.  He exercises with enough effort to increase his heart rate 3 or less days per week. He is missing 1 dose(s) of medications per week.  He is not taking prescribed medications regularly due to remembering to take.     -hx of T2DM, variable control.  On Jardiance, taking this regularly.  Not checking sugars  -working out lifting weights regularly  -weight is about the same.  Diet is stil \"not great\"  -feeling ok otherwise    -BP in normal range.  Patient denies any exertional chest pain, dyspnea, palpitations, syncope, orthopnea, edema or paroxysmal nocturnal dyspnea.     -anxiety is managed well with medications.      " "  Objective    /79 (BP Location: Left arm, Patient Position: Sitting, Cuff Size: Adult Large)   Pulse 67   Temp 98.2  F (36.8  C) (Temporal)   Resp 21   Ht 1.8 m (5' 10.87\")   Wt 118.8 kg (262 lb)   SpO2 96%   BMI 36.68 kg/m    Body mass index is 36.68 kg/m .  Physical Exam   GENERAL: alert and no distress  NECK: no adenopathy, no asymmetry, masses, or scars  RESP: lungs clear to auscultation - no rales, rhonchi or wheezes  CV: regular rate and rhythm, normal S1 S2, no S3 or S4, no murmur, click or rub, no peripheral edema  ABDOMEN: soft, nontender, no hepatosplenomegaly, no masses and bowel sounds normal  PSYCH: mentation appears normal, affect normal/bright    Results for orders placed or performed in visit on 11/01/24   HEMOGLOBIN A1C     Status: Abnormal   Result Value Ref Range    Estimated Average Glucose 166 (H) <117 mg/dL    Hemoglobin A1C 7.4 (H) 0.0 - 5.6 %   Lipid Profile (Chol, Trig, HDL, LDL calc)     Status: Abnormal   Result Value Ref Range    Cholesterol 141 <200 mg/dL    Triglycerides 169 (H) <150 mg/dL    Direct Measure HDL 32 (L) >=40 mg/dL    LDL Cholesterol Calculated 75 <100 mg/dL    Non HDL Cholesterol 109 <130 mg/dL    Patient Fasting > 8hrs? Unknown     Narrative    Cholesterol  Desirable: < 200 mg/dL  Borderline High: 200 - 239 mg/dL  High: >= 240 mg/dL    Triglycerides  Normal: < 150 mg/dL  Borderline High: 150 - 199 mg/dL  High: 200-499 mg/dL  Very High: >= 500 mg/dL    Direct Measure HDL  Female: >= 50 mg/dL   Male: >= 40 mg/dL    LDL Cholesterol  Desirable: < 100 mg/dL  Above Desirable: 100 - 129 mg/dL   Borderline High: 130 - 159 mg/dL   High:  160 - 189 mg/dL   Very High: >= 190 mg/dL    Non HDL Cholesterol  Desirable: < 130 mg/dL  Above Desirable: 130 - 159 mg/dL  Borderline High: 160 - 189 mg/dL  High: 190 - 219 mg/dL  Very High: >= 220 mg/dL           Signed Electronically by: Kat Serrato PA-C  Prior to immunization administration, verified patients identity " using patient s name and date of birth. Please see Immunization Activity for additional information.     Screening Questionnaire for Adult Immunization    Are you sick today?   No   Do you have allergies to medications, food, a vaccine component or latex?   No   Have you ever had a serious reaction after receiving a vaccination?   No   Do you have a long-term health problem with heart, lung, kidney, or metabolic disease (e.g., diabetes), asthma, a blood disorder, no spleen, complement component deficiency, a cochlear implant, or a spinal fluid leak?  Are you on long-term aspirin therapy?   Yes   Do you have cancer, leukemia, HIV/AIDS, or any other immune system problem?   No   Do you have a parent, brother, or sister with an immune system problem?   No   In the past 3 months, have you taken medications that affect  your immune system, such as prednisone, other steroids, or anticancer drugs; drugs for the treatment of rheumatoid arthritis, Crohn s disease, or psoriasis; or have you had radiation treatments?   No   Have you had a seizure, or a brain or other nervous system problem?   No   During the past year, have you received a transfusion of blood or blood    products, or been given immune (gamma) globulin or antiviral drug?   No   For women: Are you pregnant or is there a chance you could become       pregnant during the next month?   No   Have you received any vaccinations in the past 4 weeks?   No     Immunization questionnaire answers were all negative.      Patient instructed to remain in clinic for 15 minutes afterwards, and to report any adverse reactions.     Screening performed by Christine Rider MA on 11/1/2024 at 3:50 PM.

## 2024-11-02 LAB
CHOLEST SERPL-MCNC: 141 MG/DL
FASTING STATUS PATIENT QL REPORTED: ABNORMAL
HDLC SERPL-MCNC: 32 MG/DL
LDLC SERPL CALC-MCNC: 75 MG/DL
NONHDLC SERPL-MCNC: 109 MG/DL
TRIGL SERPL-MCNC: 169 MG/DL

## 2024-11-13 ENCOUNTER — MYC MEDICAL ADVICE (OUTPATIENT)
Dept: FAMILY MEDICINE | Facility: CLINIC | Age: 44
End: 2024-11-13
Payer: COMMERCIAL

## 2024-11-14 ENCOUNTER — NURSE TRIAGE (OUTPATIENT)
Dept: FAMILY MEDICINE | Facility: CLINIC | Age: 44
End: 2024-11-14
Payer: COMMERCIAL

## 2024-11-14 NOTE — TELEPHONE ENCOUNTER
Kat,     Pt reports has had a cough for close to two weeks now. Has tried OTC cough medicine  (Mucinex) without effectiveness. His mother visited from out of town and gave him prometzine codeine and this helped tremendously. Pt is requesting a prescription for this.     Please see triage notes and advise.     Nurse Triage SBAR    Is this a 2nd Level Triage? YES, LICENSED PRACTITIONER REVIEW IS REQUIRED    Situation: Acute cough with nasal drainage for at least 2 wks. Cough is better now during the day but remain the same at night where he coughs more.     Background: Cough started approx. 2 wks ago, productive with green mucus. Cough is much improved now. Less coughing during the day now with whitish-green mucus. Coughing more at night and this has not changed. Has HTN and on Lisinopril.     Assessment: Cough is more dry now, occasional produced whitish-green mucus. Day cough is much improved. Cough at night remains unchanged.     Protocol Recommended Disposition:   See in Office Within 3 Days  Pt does not want to be seen, but would like a prescription for prometzine codeine to take at night to relieve nigh cough.     Recommendation: Danielito advise.     Routed to provider    Does the patient meet one of the following criteria for ADS visit consideration? No     Cindi GUTIERREZ RN  [facility]      Reason for Disposition   Taking an ACE Inhibitor medicine (e.g., benazepril/LOTENSIN, captopril/CAPOTEN, enalapril/VASOTEC, lisinopril/ZESTRIL)    Additional Information   Negative: Bluish (or gray) lips or face   Negative: SEVERE difficulty breathing (e.g., struggling for each breath, speaks in single words)   Negative: Rapid onset of cough and has hives   Negative: Coughing started suddenly after medicine, an allergic food or bee sting   Negative: Difficulty breathing after exposure to flames, smoke, or fumes   Negative: Sounds like a life-threatening emergency to the triager   Negative: Previous asthma attacks  and this feels like asthma attack   Negative: Dry cough (non-productive; no sputum or minimal clear sputum) and within 14 days of COVID-19 Exposure   Negative: MODERATE difficulty breathing (e.g., speaks in phrases, SOB even at rest, pulse 100-120) and still present when not coughing   Negative: Chest pain present when not coughing   Negative: Passed out (i.e., fainted, collapsed and was not responding)   Negative: Patient sounds very sick or weak to the triager   Negative: MILD difficulty breathing (e.g., minimal/no SOB at rest, SOB with walking, pulse <100) and still present when not coughing   Negative: Coughed up > 1 tablespoon (15 ml) blood (Exception: Blood-tinged sputum.)   Negative: Fever > 103 F (39.4 C)   Negative: Fever > 101 F (38.3 C) and over 60 years of age   Negative: Fever > 100.0 F (37.8 C) and has diabetes mellitus or a weak immune system (e.g., HIV positive, cancer chemotherapy, organ transplant, splenectomy, chronic steroids)   Negative: Fever > 100.0 F (37.8 C) and bedridden (e.g., CVA, chronic illness, recovering from surgery)   Negative: Increasing ankle swelling   Negative: Wheezing is present   Negative: SEVERE coughing spells (e.g., whooping sound after coughing, vomiting after coughing)   Negative: Coughing up roya-colored (reddish-brown) or blood-tinged sputum   Negative: Fever present > 3 days (72 hours)   Negative: Fever returns after gone for over 24 hours and symptoms worse or not improved   Negative: Using nasal washes and pain medicine > 24 hours and sinus pain persists   Negative: Known COPD or other severe lung disease (i.e., bronchiectasis, cystic fibrosis, lung surgery) and worsening symptoms (i.e., increased sputum purulence or amount, increased breathing difficulty)   Negative: Continuous (nonstop) coughing interferes with work or school and no improvement using cough treatment per Care Advice   Negative: Patient wants to be seen   Negative: Cough has been present for > 3  "weeks   Negative: Allergy symptoms are also present (e.g., itchy eyes, clear nasal discharge, postnasal drip)   Negative: Nasal discharge present > 10 days   Negative: Exposure to TB (Tuberculosis)    Answer Assessment - Initial Assessment Questions  1. ONSET: \"When did the cough begin?\"       11/4/24   2. SEVERITY: \"How bad is the cough today?\"       It is better during the day but coughing more at night    3. SPUTUM: \"Describe the color of your sputum\" (none, dry cough; clear, white, yellow, green)      Whitish-green, every now and then. Sputum is less now    4. HEMOPTYSIS: \"Are you coughing up any blood?\" If so ask: \"How much?\" (flecks, streaks, tablespoons, etc.)      No    5. DIFFICULTY BREATHING: \"Are you having difficulty breathing?\" If Yes, ask: \"How bad is it?\" (e.g., mild, moderate, severe)     - MILD: No SOB at rest, mild SOB with walking, speaks normally in sentences, can lie down, no retractions, pulse < 100.     - MODERATE: SOB at rest, SOB with minimal exertion and prefers to sit, cannot lie down flat, speaks in phrases, mild retractions, audible wheezing, pulse 100-120.     - SEVERE: Very SOB at rest, speaks in single words, struggling to breathe, sitting hunched forward, retractions, pulse > 120       No    6. FEVER: \"Do you have a fever?\" If Yes, ask: \"What is your temperature, how was it measured, and when did it start?\"      Has had low grade fever when cough first started. Fever has cleared up now.   7. CARDIAC HISTORY: \"Do you have any history of heart disease?\" (e.g., heart attack, congestive heart failure)       No    8. LUNG HISTORY: \"Do you have any history of lung disease?\"  (e.g., pulmonary embolus, asthma, emphysema)      No    9. PE RISK FACTORS: \"Do you have a history of blood clots?\" (or: recent major surgery, recent prolonged travel, bedridden)      No    10. OTHER SYMPTOMS: \"Do you have any other symptoms?\" (e.g., runny nose, wheezing, chest pain)        Runny nose with clear " "drainage    11. PREGNANCY: \"Is there any chance you are pregnant?\" \"When was your last menstrual period?\"        NA  12. TRAVEL: \"Have you traveled out of the country in the last month?\" (e.g., travel history, exposures)        NA    Protocols used: Cough-A-[place]    "

## 2024-11-15 NOTE — TELEPHONE ENCOUNTER
I recommend that he is seen for the cough.  I can see him next week or perhaps an opening on Monday?  Otherwise urgent care for further eval of the cough.  Kat Serrato PA-C on 11/15/2024 at 3:30 PM

## 2024-11-15 NOTE — TELEPHONE ENCOUNTER
Returned call to patient and relayed message below from PCP. Patient is agreeable with evaluation however does not want to wait until next week. He states he will be seen in urgent care over the weekend.

## 2024-12-31 ENCOUNTER — APPOINTMENT (OUTPATIENT)
Dept: ULTRASOUND IMAGING | Facility: CLINIC | Age: 44
End: 2024-12-31
Payer: COMMERCIAL

## 2024-12-31 ENCOUNTER — HOSPITAL ENCOUNTER (EMERGENCY)
Facility: CLINIC | Age: 44
Discharge: HOME | End: 2024-12-31
Payer: COMMERCIAL

## 2024-12-31 VITALS
HEIGHT: 72 IN | WEIGHT: 262 LBS | DIASTOLIC BLOOD PRESSURE: 75 MMHG | OXYGEN SATURATION: 94 % | RESPIRATION RATE: 17 BRPM | BODY MASS INDEX: 35.49 KG/M2 | TEMPERATURE: 98.5 F | HEART RATE: 89 BPM | SYSTOLIC BLOOD PRESSURE: 138 MMHG

## 2024-12-31 DIAGNOSIS — K76.0 FATTY LIVER: ICD-10-CM

## 2024-12-31 DIAGNOSIS — K52.9 ACUTE GASTROENTERITIS: ICD-10-CM

## 2024-12-31 LAB
ALBUMIN SERPL BCG-MCNC: 5.1 G/DL (ref 3.5–5.2)
ALP SERPL-CCNC: 77 U/L (ref 40–150)
ALT SERPL W P-5'-P-CCNC: 47 U/L (ref 0–70)
ANION GAP SERPL CALCULATED.3IONS-SCNC: 15 MMOL/L (ref 7–15)
AST SERPL W P-5'-P-CCNC: 33 U/L (ref 0–45)
BASOPHILS # BLD AUTO: 0 10E3/UL (ref 0–0.2)
BASOPHILS NFR BLD AUTO: 0 %
BILIRUB DIRECT SERPL-MCNC: 0.3 MG/DL (ref 0–0.3)
BILIRUB SERPL-MCNC: 1.5 MG/DL
BUN SERPL-MCNC: 16.6 MG/DL (ref 6–20)
CALCIUM SERPL-MCNC: 10.2 MG/DL (ref 8.8–10.4)
CHLORIDE SERPL-SCNC: 97 MMOL/L (ref 98–107)
CREAT SERPL-MCNC: 1.04 MG/DL (ref 0.67–1.17)
EGFRCR SERPLBLD CKD-EPI 2021: 90 ML/MIN/1.73M2
EOSINOPHIL # BLD AUTO: 0 10E3/UL (ref 0–0.7)
EOSINOPHIL NFR BLD AUTO: 0 %
ERYTHROCYTE [DISTWIDTH] IN BLOOD BY AUTOMATED COUNT: 14.6 % (ref 10–15)
GLUCOSE SERPL-MCNC: 159 MG/DL (ref 70–99)
HCO3 SERPL-SCNC: 27 MMOL/L (ref 22–29)
HCT VFR BLD AUTO: 48.7 % (ref 40–53)
HGB BLD-MCNC: 16.3 G/DL (ref 13.3–17.7)
IMM GRANULOCYTES # BLD: 0 10E3/UL
IMM GRANULOCYTES NFR BLD: 0 %
LIPASE SERPL-CCNC: 42 U/L (ref 13–60)
LYMPHOCYTES # BLD AUTO: 0.6 10E3/UL (ref 0.8–5.3)
LYMPHOCYTES NFR BLD AUTO: 6 %
MAGNESIUM SERPL-MCNC: 2 MG/DL (ref 1.7–2.3)
MCH RBC QN AUTO: 27.7 PG (ref 26.5–33)
MCHC RBC AUTO-ENTMCNC: 33.5 G/DL (ref 31.5–36.5)
MCV RBC AUTO: 83 FL (ref 78–100)
MONOCYTES # BLD AUTO: 0.4 10E3/UL (ref 0–1.3)
MONOCYTES NFR BLD AUTO: 4 %
NEUTROPHILS # BLD AUTO: 9 10E3/UL (ref 1.6–8.3)
NEUTROPHILS NFR BLD AUTO: 89 %
NRBC # BLD AUTO: 0 10E3/UL
NRBC BLD AUTO-RTO: 0 /100
PLATELET # BLD AUTO: 243 10E3/UL (ref 150–450)
POTASSIUM SERPL-SCNC: 4.3 MMOL/L (ref 3.4–5.3)
PROT SERPL-MCNC: 8.4 G/DL (ref 6.4–8.3)
RBC # BLD AUTO: 5.88 10E6/UL (ref 4.4–5.9)
SODIUM SERPL-SCNC: 139 MMOL/L (ref 135–145)
WBC # BLD AUTO: 10.1 10E3/UL (ref 4–11)

## 2024-12-31 PROCEDURE — 250N000011 HC RX IP 250 OP 636

## 2024-12-31 PROCEDURE — 96375 TX/PRO/DX INJ NEW DRUG ADDON: CPT

## 2024-12-31 PROCEDURE — 82947 ASSAY GLUCOSE BLOOD QUANT: CPT

## 2024-12-31 PROCEDURE — 84132 ASSAY OF SERUM POTASSIUM: CPT

## 2024-12-31 PROCEDURE — 96361 HYDRATE IV INFUSION ADD-ON: CPT

## 2024-12-31 PROCEDURE — 83690 ASSAY OF LIPASE: CPT

## 2024-12-31 PROCEDURE — 99285 EMERGENCY DEPT VISIT HI MDM: CPT | Mod: 25

## 2024-12-31 PROCEDURE — 76705 ECHO EXAM OF ABDOMEN: CPT

## 2024-12-31 PROCEDURE — 83735 ASSAY OF MAGNESIUM: CPT

## 2024-12-31 PROCEDURE — 84075 ASSAY ALKALINE PHOSPHATASE: CPT

## 2024-12-31 PROCEDURE — 84155 ASSAY OF PROTEIN SERUM: CPT

## 2024-12-31 PROCEDURE — 85004 AUTOMATED DIFF WBC COUNT: CPT

## 2024-12-31 PROCEDURE — 96374 THER/PROPH/DIAG INJ IV PUSH: CPT

## 2024-12-31 PROCEDURE — 36415 COLL VENOUS BLD VENIPUNCTURE: CPT

## 2024-12-31 PROCEDURE — 258N000003 HC RX IP 258 OP 636

## 2024-12-31 RX ORDER — ONDANSETRON 2 MG/ML
4 INJECTION INTRAMUSCULAR; INTRAVENOUS ONCE
Status: COMPLETED | OUTPATIENT
Start: 2024-12-31 | End: 2024-12-31

## 2024-12-31 RX ORDER — KETOROLAC TROMETHAMINE 15 MG/ML
15 INJECTION, SOLUTION INTRAMUSCULAR; INTRAVENOUS ONCE
Status: COMPLETED | OUTPATIENT
Start: 2024-12-31 | End: 2024-12-31

## 2024-12-31 RX ORDER — ONDANSETRON 4 MG/1
4 TABLET, ORALLY DISINTEGRATING ORAL EVERY 8 HOURS PRN
Qty: 10 TABLET | Refills: 0 | Status: SHIPPED | OUTPATIENT
Start: 2024-12-31 | End: 2025-01-03

## 2024-12-31 RX ADMIN — FAMOTIDINE 20 MG: 10 INJECTION, SOLUTION INTRAVENOUS at 21:00

## 2024-12-31 RX ADMIN — ONDANSETRON 4 MG: 2 INJECTION, SOLUTION INTRAMUSCULAR; INTRAVENOUS at 21:00

## 2024-12-31 RX ADMIN — SODIUM CHLORIDE 500 ML: 9 INJECTION, SOLUTION INTRAVENOUS at 20:55

## 2024-12-31 RX ADMIN — KETOROLAC TROMETHAMINE 15 MG: 15 INJECTION, SOLUTION INTRAMUSCULAR; INTRAVENOUS at 21:00

## 2024-12-31 ASSESSMENT — COLUMBIA-SUICIDE SEVERITY RATING SCALE - C-SSRS
2. HAVE YOU ACTUALLY HAD ANY THOUGHTS OF KILLING YOURSELF IN THE PAST MONTH?: NO
6. HAVE YOU EVER DONE ANYTHING, STARTED TO DO ANYTHING, OR PREPARED TO DO ANYTHING TO END YOUR LIFE?: NO
1. IN THE PAST MONTH, HAVE YOU WISHED YOU WERE DEAD OR WISHED YOU COULD GO TO SLEEP AND NOT WAKE UP?: NO

## 2024-12-31 ASSESSMENT — ACTIVITIES OF DAILY LIVING (ADL)
ADLS_ACUITY_SCORE: 46

## 2025-01-01 NOTE — ED NOTES
Patient discharged with spouse after reviewing the AVS.  No questions or concerns at this time.  Patient comfortable and agrees with plan.

## 2025-01-01 NOTE — ED TRIAGE NOTES
Sudden onset generalized abd pain starting 1200 today; feels like pressure and bloating. + nausea and diarrhea x1, no vomiting. No sick contacts at home, no meds PTA.     No blood in urine/vomit/stool; otherwise voiding okay.     Triage Assessment (Adult)       Row Name 12/31/24 1929          Triage Assessment    Airway WDL WDL        Respiratory WDL    Respiratory WDL WDL        Skin Circulation/Temperature WDL    Skin Circulation/Temperature WDL WDL        Cardiac WDL    Cardiac WDL WDL        Peripheral/Neurovascular WDL    Peripheral Neurovascular WDL WDL        Cognitive/Neuro/Behavioral WDL    Cognitive/Neuro/Behavioral WDL WDL

## 2025-01-01 NOTE — ED PROVIDER NOTES
EMERGENCY DEPARTMENT ENCOUNTER      NAME: Chencho Mane Jr.  AGE: 44 year old male  YOB: 1980  MRN: [id number]  EVALUATION DATE & TIME: 2024  8:30 PM    PCP: Kat Serrato    ED PROVIDER: Nohemi Lockhart PA-C      Chief Complaint   Patient presents with    Abdominal Pain         FINAL IMPRESSION:  1. Acute gastroenteritis    2. Fatty liver          ED COURSE & MEDICAL DECISION MAKIN:40 PM Met with patient for initial interview. Plan for care discussed.  9:19 PM Reevaluated and updated patient. Plan for RUQ US.  10:53 PM Reevaluated and updated patient. I discussed the plan for discharge with the patient, and patient is agreeable. We discussed supportive cares at home and reasons for return to the ER including new or worsening symptoms. All questions and concerns addressed. Patient to be discharged by RN.    44 year old male with a history of HTN, HLD, DM II (non-insulin dependent) presents to the Emergency Department for evaluation of nausea, diarrhea, and abdominal bloating that started earlier this afternoon. Upon exam, patient is afebrile, hemodynamically stable, but appears uncomfortable. RUQ and epigastric tenderness to palpation without guarding or rebound. No CVA tenderness to percussion. Differential diagnosis includes but not limited to viral gastroenteritis, hepatitis, gallbladder pathology, pancreatitis, electrolyte abnormality, anemia, dehydration. Based on patient's presenting symptoms, laboratories and imaging were ordered.    CBC without leukocytosis or anemia. BMP with mild hyperglycemia at 159 without anion gap. HFP with mild elevation in bilirubin at 1.5, otherwise unremarkable. Mg WNL. Lipase WNL. RUQ US revealed fatty liver, otherwise no acute gallbladder abnormalities.     Patient was treated with Toradol, Zofran, Pepcid, and NS upon arrival with improvement in symptoms. Symptoms and workup most consistent with acute gastroenteritis. Patient was made aware of  the above findings. Patient able to pass PO challenge in ED. Plan to discharge patient home with prescription Zofran, strict return precautions, and close follow up with their PCP for reevaluation and ongoing management if symptoms persist. The patient was stable and well appearing upon discharge. The patient was advised to return to the ER if any new or worsening symptoms develop. The patient verbalizes understanding and agrees with the plan.     Medical Decision Making  Obtained supplemental history:Supplemental history obtained?: Documented in chart and Family Member/Significant Other  Reviewed external records: External records reviewed?: No  Care impacted by chronic illness:Documented in Chart, Diabetes, Hyperlipidemia, Hypertension, Mental Health, and Other: Fatty Liver, Obesity  Did you consider but not order tests?: Work up considered but not performed and documented in chart, if applicable  Did you interpret images independently?: Independent interpretation of ECG and images noted in documentation, when applicable.  Consultation discussion with other provider:Did you involve another provider (consultant, MH, pharmacy, etc.)?: No  Discharge. I prescribed additional prescription strength medication(s) as charted. See documentation for any additional details.    MIPS: Not Applicable    MEDICATIONS GIVEN IN THE EMERGENCY:  Medications   ondansetron (ZOFRAN) injection 4 mg (4 mg Intravenous $Given 12/31/24 2100)   famotidine (PEPCID) injection 20 mg (20 mg Intravenous $Given 12/31/24 2100)   ketorolac (TORADOL) injection 15 mg (15 mg Intravenous $Given 12/31/24 2100)   sodium chloride 0.9% BOLUS 500 mL (0 mLs Intravenous Stopped 12/31/24 2154)       NEW PRESCRIPTIONS STARTED AT TODAY'S ER VISIT  New Prescriptions    ONDANSETRON (ZOFRAN ODT) 4 MG ODT TAB    Take 1 tablet (4 mg) by mouth every 8 hours as needed for nausea or vomiting.           =================================================================    [facility]    Patient information was obtained from: Patient and Patient's Wife     Use of : N/A      Chencho Mane Jr. is a 44 year old male with a pertinent medical history of fatty liver, T2DM, obesity, HLD, HTN, anxiety, who presents to this ED via walk-in for evaluation of abdominal pain and diarrhea.     Patient reports that earlier today he developed constant generalized abdominal pain. He also endorses having diarrhea today. He endorses associated abdominal distention, dizziness, nausea, and subjective fever, but he denies any recent blood in his stool, mucous in his stool, or vomiting. The patient's wife mentions that at approximately 6:00 - 6:30 PM today she measured his temperature and found it to be approximately 99.2 F. He endorses recently eating greens that were leftovers from approximately 4 days ago, but the patient's wife mentions that other family members also ate these greens and did not develop similar symptoms. He also endorses recently visiting his uncle in an ICU approximately 2 days ago. He denies any recent known sick/ill contacts with similar symptoms. He denies any recent travel. He denies any recent hospitalizations. He endorses regularly taking Lisinopril, Metoprolol, Simvastatin, Hydrochlorothiazide, and Semaglutide. He denies any recent medication changes, including starting any new medications recently. He denies any recent antibiotic use. He denies any recent chest pain, shortness of breath, urinary issues, cold symptoms, or any other complications at this time.       REVIEW OF SYSTEMS   Review of Systems   As per HPI.    PAST MEDICAL HISTORY:  Past Medical History:   Diagnosis Date    Hypertension     Palpitations     Sleep apnea        PAST SURGICAL HISTORY:  Past Surgical History:   Procedure Laterality Date    ANGIOGRAM      COLONOSCOPY N/A 9/22/2023    Procedure: Colonoscopy;  Surgeon: Hubert Walter  MD LUCIA;  Location:  GI    HERNIA REPAIR      age 4       CURRENT MEDICATIONS:    aspirin (ASPIRIN LOW DOSE) 81 MG EC tablet  atorvastatin (LIPITOR) 40 MG tablet  bisacodyl (DULCOLAX) 5 MG EC tablet  citalopram (CELEXA) 40 MG tablet  empagliflozin (JARDIANCE) 25 MG TABS tablet  hydrochlorothiazide (HYDRODIURIL) 25 MG tablet  hydrOXYzine HCl (ATARAX) 25 MG tablet  hydrOXYzine HCl (ATARAX) 25 MG tablet  lisinopril (ZESTRIL) 10 MG tablet  metoprolol tartrate (LOPRESSOR) 50 MG tablet  multivitamin w/minerals (THERA-VIT-M) tablet  naproxen (NAPROSYN) 250 MG tablet  ondansetron (ZOFRAN ODT) 4 MG ODT tab  polyethylene glycol (GOLYTELY) 236 g suspension  semaglutide (OZEMPIC) 2 MG/3ML pen  semaglutide (OZEMPIC) 2 MG/3ML pen  Semaglutide, 1 MG/DOSE, (OZEMPIC) 4 MG/3ML pen  Semaglutide-Weight Management (WEGOVY) 0.25 MG/0.5ML pen  Semaglutide-Weight Management (WEGOVY) 0.5 MG/0.5ML pen  Semaglutide-Weight Management (WEGOVY) 1 MG/0.5ML pen  sildenafil (VIAGRA) 100 MG tablet  simvastatin (ZOCOR) 40 MG tablet        ALLERGIES:  No Known Allergies    FAMILY HISTORY:  No family history on file.    SOCIAL HISTORY:   Social History     Socioeconomic History    Marital status:    Tobacco Use    Smoking status: Never     Passive exposure: Past    Smokeless tobacco: Never   Vaping Use    Vaping status: Never Used   Substance and Sexual Activity    Alcohol use: Yes     Comment: socially    Drug use: Never     Social Drivers of Health     Financial Resource Strain: Low Risk  (12/13/2023)    Financial Resource Strain     Within the past 12 months, have you or your family members you live with been unable to get utilities (heat, electricity) when it was really needed?: No   Food Insecurity: Not on File (9/26/2024)    Received from [employer]    Food Insecurity     Food: 0   Transportation Needs: Low Risk  (12/13/2023)    Transportation Needs     Within the past 12 months, has lack of transportation kept you from medical appointments,  getting your medicines, non-medical meetings or appointments, work, or from getting things that you need?: No   Physical Activity: Not on File (11/26/2021)    Received from [employer]    Physical Activity     Physical Activity: 0   Stress: Not on File (11/26/2021)    Received from [employer]    Stress     Stress: 0   Social Connections: Not on File (9/12/2024)    Received from [employer]    Social Connections     Connectedness: 0   Interpersonal Safety: Low Risk  (6/21/2024)    Interpersonal Safety     Do you feel physically and emotionally safe where you currently live?: Yes     Within the past 12 months, have you been hit, slapped, kicked or otherwise physically hurt by someone?: No     Within the past 12 months, have you been humiliated or emotionally abused in other ways by your partner or ex-partner?: No   Housing Stability: Low Risk  (12/13/2023)    Housing Stability     Do you have housing? : Yes     Are you worried about losing your housing?: No       VITALS:  /70   Pulse 89   Temp 98.5  F (36.9  C) (Temporal)   Resp 17   Ht 1.829 m (6')   Wt 118.8 kg (262 lb)   SpO2 94%   BMI 35.53 kg/m      PHYSICAL EXAM    Constitutional:  Alert, appears uncomfortable. Cooperative.  EYES: Conjunctivae clear.   HENT:  Atraumatic, normocephalic.  Respiratory:  Respirations even, unlabored, in no acute respiratory distress. Lungs clear to auscultation bilaterally without wheeze, rhonchi, or rales. No cough. Speaks in full sentences easily.  Cardiovascular:  Regular rate and rhythm, good peripheral perfusion. No peripheral edema. No chest wall tenderness.  GI: Soft, flat, non-distended. Bowel sounds normal. Epigastric and mild RUQ tenderness to palpation. No guarding, rebound, or other peritoneal signs. No CVA tenderness to percussion.  Musculoskeletal:  No edema. No cyanosis. Range of motion major extremities intact.    Integument: Warm, Dry. No rash to visualized skin.   Neurologic:  Alert & oriented. No focal  deficits noted.   Psych: Normal mood and affect.      LAB:  All pertinent labs reviewed and interpreted.  Results for orders placed or performed during the hospital encounter of 12/31/24   US Abdomen Limited    Impression    IMPRESSION:  1.  No gallstone or biliary dilatation.  2.  Fatty infiltration of the liver. Probable focal fatty sparing adjacent to the gallbladder fossa.       Basic metabolic panel   Result Value Ref Range    Sodium 139 135 - 145 mmol/L    Potassium 4.3 3.4 - 5.3 mmol/L    Chloride 97 (L) 98 - 107 mmol/L    Carbon Dioxide (CO2) 27 22 - 29 mmol/L    Anion Gap 15 7 - 15 mmol/L    Urea Nitrogen 16.6 6.0 - 20.0 mg/dL    Creatinine 1.04 0.67 - 1.17 mg/dL    GFR Estimate 90 >60 mL/min/1.73m2    Calcium 10.2 8.8 - 10.4 mg/dL    Glucose 159 (H) 70 - 99 mg/dL   Hepatic function panel   Result Value Ref Range    Protein Total 8.4 (H) 6.4 - 8.3 g/dL    Albumin 5.1 3.5 - 5.2 g/dL    Bilirubin Total 1.5 (H) <=1.2 mg/dL    Alkaline Phosphatase 77 40 - 150 U/L    AST 33 0 - 45 U/L    ALT 47 0 - 70 U/L    Bilirubin Direct 0.30 0.00 - 0.30 mg/dL   Result Value Ref Range    Lipase 42 13 - 60 U/L   Result Value Ref Range    Magnesium 2.0 1.7 - 2.3 mg/dL   CBC with platelets and differential   Result Value Ref Range    WBC Count 10.1 4.0 - 11.0 10e3/uL    RBC Count 5.88 4.40 - 5.90 10e6/uL    Hemoglobin 16.3 13.3 - 17.7 g/dL    Hematocrit 48.7 40.0 - 53.0 %    MCV 83 78 - 100 fL    MCH 27.7 26.5 - 33.0 pg    MCHC 33.5 31.5 - 36.5 g/dL    RDW 14.6 10.0 - 15.0 %    Platelet Count 243 150 - 450 10e3/uL    % Neutrophils 89 %    % Lymphocytes 6 %    % Monocytes 4 %    % Eosinophils 0 %    % Basophils 0 %    % Immature Granulocytes 0 %    NRBCs per 100 WBC 0 <1 /100    Absolute Neutrophils 9.0 (H) 1.6 - 8.3 10e3/uL    Absolute Lymphocytes 0.6 (L) 0.8 - 5.3 10e3/uL    Absolute Monocytes 0.4 0.0 - 1.3 10e3/uL    Absolute Eosinophils 0.0 0.0 - 0.7 10e3/uL    Absolute Basophils 0.0 0.0 - 0.2 10e3/uL    Absolute Immature  Granulocytes 0.0 <=0.4 10e3/uL    Absolute NRBCs 0.0 10e3/uL       RADIOLOGY:  Reviewed all pertinent imaging. Please see official radiology report.  US Abdomen Limited   Final Result   IMPRESSION:   1.  No gallstone or biliary dilatation.   2.  Fatty infiltration of the liver. Probable focal fatty sparing adjacent to the gallbladder fossa.                EKG:    None.    PROCEDURES:   None.    IDima, am serving as a scribe to document services personally performed by Nohemi Lockhart PA-C based on my observation and the provider's statements to me. I, Nohemi Lockhart PA-C, attest that Dima Mims is acting in a scribe capacity, has observed my performance of the services and has documented them in accordance with my direction.    Nohemi Lockhart PA-C  [facility] EMERGENCY ROOM  [address] [id number] [phone]        Nohemi Lockhart PA-C  12/31/24 [id number]

## 2025-01-01 NOTE — DISCHARGE INSTRUCTIONS
Rest, stay well hydrated (Pedialyte), follow BRAT diet (bananas, rice, applesauce, toast), and increase diet as tolerates. You may take Zofran as needed for nausea. You may take ibuprofen or Tylenol as needed for pain.    Tylenol (Acetaminophen) Discharge Instructions:  You may take 2 tablets of regular strength, over-the-counter, Tylenol (acetaminophen) every 4-6 hours as needed for pain.  Take no more than 4000 mg of Tylenol in a 24-hour period.      Avoid taking more than 1 acetaminophen-containing product at a time and be aware that many over-the-counter medications contain a combination of acetaminophen and other products.  If you are taking Tylenol in addition to a combination product please keep track of your daily acetaminophen dose to make sure you do not exceed the recommended 4000 mg.  Taking too much acetaminophen can cause permanent damage to your liver.    Ibuprofen/Naproxen Discharge Instructions:  You may take ibuprofen for pain control.  The maximum dose of (ibuprofen is 3200 mg ) in a 24-hour period.    Take this medication with food to prevent stomach irritation.  With long-term use this medication can irritate the stomach causing pain and lead to development of a stomach ulcer.  If you notice stomach pain or vomiting of coffee-ground colored vomit or blood, please be seen by a healthcare provider.  Attempt to use this medication for the shortest time possible.      Follow-up with your primary care provider for reevaluation and ongoing management, especially if symptoms persist.    Return to the emergency department for any new or worsening symptoms including severe abdominal pain, vomiting, vomiting blood, multiple episodes of diarrhea, bloody stools, uncontrolled fever/chills, chest pain, shortness of breath, or any other concerning symptoms.     Take Care!  - Nohemi Lockhart PA-C

## 2025-02-16 ENCOUNTER — HEALTH MAINTENANCE LETTER (OUTPATIENT)
Age: 45
End: 2025-02-16

## 2025-02-26 DIAGNOSIS — F41.9 ANXIETY: ICD-10-CM

## 2025-02-27 RX ORDER — CITALOPRAM HYDROBROMIDE 40 MG/1
40 TABLET ORAL DAILY
Qty: 90 TABLET | Refills: 0 | Status: SHIPPED | OUTPATIENT
Start: 2025-02-27

## 2025-03-16 ENCOUNTER — MYC REFILL (OUTPATIENT)
Dept: FAMILY MEDICINE | Facility: CLINIC | Age: 45
End: 2025-03-16
Payer: COMMERCIAL

## 2025-03-16 DIAGNOSIS — Z00.00 PHYSICAL EXAM, ANNUAL: ICD-10-CM

## 2025-03-16 RX ORDER — ASPIRIN 81 MG/1
81 TABLET ORAL DAILY
Qty: 100 TABLET | Refills: 2 | OUTPATIENT
Start: 2025-03-16

## 2025-04-18 ASSESSMENT — ANXIETY QUESTIONNAIRES
1. FEELING NERVOUS, ANXIOUS, OR ON EDGE: NOT AT ALL
4. TROUBLE RELAXING: NOT AT ALL
3. WORRYING TOO MUCH ABOUT DIFFERENT THINGS: NOT AT ALL
6. BECOMING EASILY ANNOYED OR IRRITABLE: NOT AT ALL
GAD7 TOTAL SCORE: 0
8. IF YOU CHECKED OFF ANY PROBLEMS, HOW DIFFICULT HAVE THESE MADE IT FOR YOU TO DO YOUR WORK, TAKE CARE OF THINGS AT HOME, OR GET ALONG WITH OTHER PEOPLE?: NOT DIFFICULT AT ALL
GAD7 TOTAL SCORE: 0
7. FEELING AFRAID AS IF SOMETHING AWFUL MIGHT HAPPEN: NOT AT ALL
IF YOU CHECKED OFF ANY PROBLEMS ON THIS QUESTIONNAIRE, HOW DIFFICULT HAVE THESE PROBLEMS MADE IT FOR YOU TO DO YOUR WORK, TAKE CARE OF THINGS AT HOME, OR GET ALONG WITH OTHER PEOPLE: NOT DIFFICULT AT ALL
2. NOT BEING ABLE TO STOP OR CONTROL WORRYING: NOT AT ALL
7. FEELING AFRAID AS IF SOMETHING AWFUL MIGHT HAPPEN: NOT AT ALL
5. BEING SO RESTLESS THAT IT IS HARD TO SIT STILL: NOT AT ALL
GAD7 TOTAL SCORE: 0

## 2025-04-23 ENCOUNTER — OFFICE VISIT (OUTPATIENT)
Dept: FAMILY MEDICINE | Facility: CLINIC | Age: 45
End: 2025-04-23
Payer: COMMERCIAL

## 2025-04-23 VITALS
TEMPERATURE: 97.9 F | WEIGHT: 265 LBS | HEART RATE: 97 BPM | DIASTOLIC BLOOD PRESSURE: 82 MMHG | BODY MASS INDEX: 35.89 KG/M2 | SYSTOLIC BLOOD PRESSURE: 130 MMHG | RESPIRATION RATE: 16 BRPM | OXYGEN SATURATION: 97 % | HEIGHT: 72 IN

## 2025-04-23 DIAGNOSIS — K76.0 METABOLIC DYSFUNCTION-ASSOCIATED STEATOTIC LIVER DISEASE (MASLD): ICD-10-CM

## 2025-04-23 DIAGNOSIS — E11.9 TYPE 2 DIABETES MELLITUS WITHOUT COMPLICATION, WITHOUT LONG-TERM CURRENT USE OF INSULIN (H): Primary | ICD-10-CM

## 2025-04-23 DIAGNOSIS — I10 PRIMARY HYPERTENSION: ICD-10-CM

## 2025-04-23 DIAGNOSIS — E78.2 MIXED HYPERLIPIDEMIA: ICD-10-CM

## 2025-04-23 DIAGNOSIS — E66.812 CLASS 2 SEVERE OBESITY DUE TO EXCESS CALORIES WITH SERIOUS COMORBIDITY AND BODY MASS INDEX (BMI) OF 35.0 TO 35.9 IN ADULT (H): ICD-10-CM

## 2025-04-23 DIAGNOSIS — E66.01 CLASS 2 SEVERE OBESITY DUE TO EXCESS CALORIES WITH SERIOUS COMORBIDITY AND BODY MASS INDEX (BMI) OF 35.0 TO 35.9 IN ADULT (H): ICD-10-CM

## 2025-04-23 LAB
EST. AVERAGE GLUCOSE BLD GHB EST-MCNC: 169 MG/DL
HBA1C MFR BLD: 7.5 % (ref 0–5.6)

## 2025-04-23 PROCEDURE — 99000 SPECIMEN HANDLING OFFICE-LAB: CPT | Performed by: PHYSICIAN ASSISTANT

## 2025-04-23 PROCEDURE — 3079F DIAST BP 80-89 MM HG: CPT | Performed by: PHYSICIAN ASSISTANT

## 2025-04-23 PROCEDURE — 83036 HEMOGLOBIN GLYCOSYLATED A1C: CPT | Performed by: PHYSICIAN ASSISTANT

## 2025-04-23 PROCEDURE — G2211 COMPLEX E/M VISIT ADD ON: HCPCS | Performed by: PHYSICIAN ASSISTANT

## 2025-04-23 PROCEDURE — 3075F SYST BP GE 130 - 139MM HG: CPT | Performed by: PHYSICIAN ASSISTANT

## 2025-04-23 PROCEDURE — 99214 OFFICE O/P EST MOD 30 MIN: CPT | Performed by: PHYSICIAN ASSISTANT

## 2025-04-23 PROCEDURE — 36415 COLL VENOUS BLD VENIPUNCTURE: CPT | Performed by: PHYSICIAN ASSISTANT

## 2025-04-23 PROCEDURE — 84244 ASSAY OF RENIN: CPT | Mod: 90 | Performed by: PHYSICIAN ASSISTANT

## 2025-04-23 PROCEDURE — 80053 COMPREHEN METABOLIC PANEL: CPT | Performed by: PHYSICIAN ASSISTANT

## 2025-04-23 RX ORDER — LANCETS
EACH MISCELLANEOUS
Qty: 100 EACH | Refills: 6 | Status: SHIPPED | OUTPATIENT
Start: 2025-04-23

## 2025-04-23 NOTE — PROGRESS NOTES
Prior to immunization administration, verified patients identity using patient s name and date of birth. Please see Immunization Activity for additional information.     Screening Questionnaire for Adult Immunization    Are you sick today?   No   Do you have allergies to medications, food, a vaccine component or latex?   No   Have you ever had a serious reaction after receiving a vaccination?   No   Do you have a long-term health problem with heart, lung, kidney, or metabolic disease (e.g., diabetes), asthma, a blood disorder, no spleen, complement component deficiency, a cochlear implant, or a spinal fluid leak?  Are you on long-term aspirin therapy?   Yes   Do you have cancer, leukemia, HIV/AIDS, or any other immune system problem?   No   Do you have a parent, brother, or sister with an immune system problem?   No   In the past 3 months, have you taken medications that affect  your immune system, such as prednisone, other steroids, or anticancer drugs; drugs for the treatment of rheumatoid arthritis, Crohn s disease, or psoriasis; or have you had radiation treatments?   No   Have you had a seizure, or a brain or other nervous system problem?   No   During the past year, have you received a transfusion of blood or blood    products, or been given immune (gamma) globulin or antiviral drug?   No   For women: Are you pregnant or is there a chance you could become       pregnant during the next month?   No   Have you received any vaccinations in the past 4 weeks?   No     Immunization questionnaire answers were all negative.      Patient instructed to remain in clinic for 15 minutes afterwards, and to report any adverse reactions.     Screening performed by Christine Rider MA on 4/23/2025 at 3:41 PM.   Answers submitted by the patient for this visit:  Lipid Visit (Submitted on 4/18/2025)  Chief Complaint: Chronic problems general questions HPI Form  Are you regularly taking any medication or supplement to lower your  cholesterol?: Yes  Are you having muscle aches or other side effects that you think could be caused by your cholesterol lowering medication?: No  Patient Health Questionnaire (G7) (Submitted on 4/18/2025)  ADOLPH 7 TOTAL SCORE: 0  Hypertension Visit (Submitted on 4/18/2025)  Chief Complaint: Chronic problems general questions HPI Form  Do you check your blood pressure regularly outside of the clinic?: No  Are your blood pressures ever more than 140 on the top number (systolic) OR more than 90 on the bottom number (diastolic)? (For example, greater than 140/90): No  Are you following a low salt diet?: No  Diabetes Visit (Submitted on 4/18/2025)  Chief Complaint: Chronic problems general questions HPI Form  Frequency of checking blood sugars:: not at all  Diabetic concerns:: none  Paraesthesia present:: none of these symptoms  Depression / Anxiety Questionnaire (Submitted on 4/18/2025)  Chief Complaint: Chronic problems general questions HPI Form  Depression/Anxiety: Anxiety  Anxiety only (Submitted on 4/18/2025)  Chief Complaint: Chronic problems general questions HPI Form  Anxiety since last: : good  Other associated symotome: : No  Significant life event: : grief or loss  Anxious:: No  Current substance use:: No  General Questionnaire (Submitted on 4/18/2025)  Chief Complaint: Chronic problems general questions HPI Form  How many servings of fruits and vegetables do you eat daily?: 2-3  On average, how many sweetened beverages do you drink each day (Examples: soda, juice, sweet tea, etc.  Do NOT count diet or artificially sweetened beverages)?: 0  How many minutes a day do you exercise enough to make your heart beat faster?: 20 to 29  How many days a week do you exercise enough to make your heart beat faster?: 4  How many days per week do you miss taking your medication?: 1  What makes it hard for you to take your medication every day?: remembering to take  Questionnaire about: Chronic problems general questions HPI  Form (Submitted on 4/18/2025)  Chief Complaint: Chronic problems general questions HPI Form

## 2025-04-23 NOTE — PROGRESS NOTES
Wt Readings from Last 3 Encounters:   04/23/25 120.2 kg (265 lb)   12/31/24 118.8 kg (262 lb)   11/01/24 118.8 kg (262 lb)      Assessment & Plan     Type 2 diabetes mellitus without complication, without long-term current use of insulin (H)  -doing well!  Would like to add GLP so will do so.  He will monitor blood sugars if this is started  -Side effects of medication(s) discussed as well as risks/benefits of taking    -plan to follow-up in 3-6 months  - Hemoglobin A1c; Future  - semaglutide (OZEMPIC) 2 MG/3ML pen; Inject 0.25 mg subcutaneously every 7 days for 28 days.  - semaglutide (OZEMPIC) 2 MG/3ML pen; Inject 0.5 mg subcutaneously every 7 days for 28 days.  - Semaglutide, 1 MG/DOSE, (OZEMPIC) 4 MG/3ML pen; Inject 1 mg subcutaneously every 7 days.  - blood glucose monitoring (NO BRAND SPECIFIED) meter device kit; Use to test blood sugar 1 times daily or as directed. Preferred blood glucose meter OR supplies to accompany: Blood Glucose Monitor Brands: per insurance.  - blood glucose (NO BRAND SPECIFIED) test strip; Use to test blood sugar 30 times daily or as directed. To accompany: Blood Glucose Monitor Brands: per insurance.  - thin (NO BRAND SPECIFIED) lancets; Use with lanceting device. To accompany: Blood Glucose Monitor Brands: per insurance.  - Hemoglobin A1c    Primary hypertension  -great control  -refills sent  - Aldosterone; Future  - Renin activity; Future  - Aldosterone Renin Ratio; Future  - Potassium; Future  - Aldosterone Renin Ratio    Mixed hyperlipidemia  -discussed nutrition changes.  He is working on this    Class 2 severe obesity due to excess calories with serious comorbidity and body mass index (BMI) of 35.0 to 35.9 in adult (H)  -GLP sent    Metabolic dysfunction-associated steatotic liver disease (MASLD)  -discussed improvement with weight loss.  GLP ordered  -will recheck LFTs  - Comprehensive metabolic panel (BMP + Alb, Alk Phos, ALT, AST, Total. Bili, TP); Future  - Comprehensive  "metabolic panel (BMP + Alb, Alk Phos, ALT, AST, Total. Bili, TP)          BMI  Estimated body mass index is 35.89 kg/m  as calculated from the following:    Height as of this encounter: 1.83 m (6' 0.05\").    Weight as of this encounter: 120.2 kg (265 lb).         Roselia Paiz is a 44 year old, presenting for the following health issues:  RECHECK      4/23/2025     3:35 PM   Additional Questions   Roomed by Christine   Accompanied by self     -h/o T2DM without complications.  Using Jardiance alone.  Working on diet.    -A1C is very good today  -not checking sugars    -weight has not changed much.  Would like to lose weight, has been hard  -lifts weights 3 times per week  -would like to discuss Ozempic    -Anxiety is managed well, no issues with this.  Taking citalopram    History of Present Illness       Mental Health Follow-up:  Patient presents to follow-up on Anxiety.    Patient's anxiety since last visit has been:  Good  The patient is not having other symptoms associated with anxiety.  Any significant life events: grief or loss  Patient is not feeling anxious or having panic attacks.  Patient has no concerns about alcohol or drug use.    Diabetes:   He presents for follow up of diabetes.    He is not checking blood glucose.         He has no concerns regarding his diabetes at this time.   He is not experiencing numbness or burning in feet, excessive thirst, blurry vision, weight changes or redness, sores or blisters on feet.           Hyperlipidemia:  He presents for follow up of hyperlipidemia.   He is taking medication to lower cholesterol. He is not having myalgia or other side effects to statin medications.    Hypertension: He presents for follow up of hypertension.  He does not check blood pressure  regularly outside of the clinic. Outpatient blood pressures have not been over 140/90. He does not follow a low salt diet.     He eats 2-3 servings of fruits and vegetables daily.He consumes 0 sweetened " "beverage(s) daily.He exercises with enough effort to increase his heart rate 20 to 29 minutes per day.  He exercises with enough effort to increase his heart rate 4 days per week. He is missing 1 dose(s) of medications per week.  He is not taking prescribed medications regularly due to remembering to take.            Objective    /82 (BP Location: Right arm, Patient Position: Sitting, Cuff Size: Adult Large)   Pulse 97   Temp 97.9  F (36.6  C) (Temporal)   Resp 16   Ht 1.83 m (6' 0.05\")   Wt 120.2 kg (265 lb)   SpO2 97%   BMI 35.89 kg/m    Body mass index is 35.89 kg/m .  Physical Exam   GENERAL: alert and no distress  NECK: no adenopathy, no asymmetry, masses, or scars  RESP: lungs clear to auscultation - no rales, rhonchi or wheezes  CV: regular rate and rhythm, normal S1 S2, no S3 or S4, no murmur, click or rub, no peripheral edema  ABDOMEN: soft, nontender, no hepatosplenomegaly, no masses and bowel sounds normal  MS: no gross musculoskeletal defects noted, no edema  Diabetic foot exam: normal DP and PT pulses, no trophic changes or ulcerative lesions, and normal sensory exam    Results for orders placed or performed in visit on 04/23/25   Comprehensive metabolic panel (BMP + Alb, Alk Phos, ALT, AST, Total. Bili, TP)     Status: Abnormal   Result Value Ref Range    Sodium 138 135 - 145 mmol/L    Potassium 4.0 3.4 - 5.3 mmol/L    Carbon Dioxide (CO2) 27 22 - 29 mmol/L    Anion Gap 13 7 - 15 mmol/L    Urea Nitrogen 13.0 6.0 - 20.0 mg/dL    Creatinine 0.96 0.67 - 1.17 mg/dL    GFR Estimate >90 >60 mL/min/1.73m2    Calcium 10.2 8.8 - 10.4 mg/dL    Chloride 98 98 - 107 mmol/L    Glucose 105 (H) 70 - 99 mg/dL    Alkaline Phosphatase 76 40 - 150 U/L    AST 43 0 - 45 U/L    ALT 55 0 - 70 U/L    Protein Total 8.2 6.4 - 8.3 g/dL    Albumin 5.2 3.5 - 5.2 g/dL    Bilirubin Total 1.3 (H) <=1.2 mg/dL   Hemoglobin A1c     Status: Abnormal   Result Value Ref Range    Estimated Average Glucose 169 (H) <117 mg/dL    " Hemoglobin A1C 7.5 (H) 0.0 - 5.6 %   Aldosterone Renin Ratio     Status: None (In process)    Narrative    The following orders were created for panel order Aldosterone Renin Ratio.  Procedure                               Abnormality         Status                     ---------                               -----------         ------                     Aldosterone[[id number]]                                     In process                 Renin activity[[id number]]                                  In process                 Aldosterone Renin Ratio[[id number]]                         In process                   Please view results for these tests on the individual orders.           Signed Electronically by: Kat Serrato PA-C

## 2025-04-24 LAB
ALBUMIN SERPL BCG-MCNC: 5.2 G/DL (ref 3.5–5.2)
ALP SERPL-CCNC: 76 U/L (ref 40–150)
ALT SERPL W P-5'-P-CCNC: 55 U/L (ref 0–70)
ANION GAP SERPL CALCULATED.3IONS-SCNC: 13 MMOL/L (ref 7–15)
AST SERPL W P-5'-P-CCNC: 43 U/L (ref 0–45)
BILIRUB SERPL-MCNC: 1.3 MG/DL
BUN SERPL-MCNC: 13 MG/DL (ref 6–20)
C TRACH DNA SPEC QL PROBE+SIG AMP: NEGATIVE
CALCIUM SERPL-MCNC: 10.2 MG/DL (ref 8.8–10.4)
CHLORIDE SERPL-SCNC: 98 MMOL/L (ref 98–107)
CREAT SERPL-MCNC: 0.96 MG/DL (ref 0.67–1.17)
EGFRCR SERPLBLD CKD-EPI 2021: >90 ML/MIN/1.73M2
GLUCOSE SERPL-MCNC: 105 MG/DL (ref 70–99)
HCO3 SERPL-SCNC: 27 MMOL/L (ref 22–29)
N GONORRHOEA DNA SPEC QL NAA+PROBE: NEGATIVE
POTASSIUM SERPL-SCNC: 4 MMOL/L (ref 3.4–5.3)
PROT SERPL-MCNC: 8.2 G/DL (ref 6.4–8.3)
SODIUM SERPL-SCNC: 138 MMOL/L (ref 135–145)
SPECIMEN TYPE: NORMAL

## 2025-04-25 ENCOUNTER — MYC MEDICAL ADVICE (OUTPATIENT)
Dept: FAMILY MEDICINE | Facility: CLINIC | Age: 45
End: 2025-04-25
Payer: COMMERCIAL

## 2025-04-25 DIAGNOSIS — E11.9 TYPE 2 DIABETES MELLITUS WITHOUT COMPLICATION, WITHOUT LONG-TERM CURRENT USE OF INSULIN (H): Primary | ICD-10-CM

## 2025-04-26 LAB — RENIN PLAS-CCNC: 18.3 NG/ML/HR

## 2025-04-28 LAB — ALDOST/RENIN PLAS-RTO: 0.7 {RATIO} (ref 0–25)

## 2025-04-28 NOTE — TELEPHONE ENCOUNTER
Kat,     Please review. Pended, sign if appropriate.  MyChart: I m approved! So do I get a prescription for 10 jardiance ?     Riley OSORIO RN  [facility]

## 2025-05-03 DIAGNOSIS — I10 ESSENTIAL HYPERTENSION: ICD-10-CM

## 2025-05-04 RX ORDER — METOPROLOL TARTRATE 50 MG
50 TABLET ORAL 2 TIMES DAILY
Qty: 180 TABLET | Refills: 2 | Status: SHIPPED | OUTPATIENT
Start: 2025-05-04

## 2025-05-18 ENCOUNTER — HEALTH MAINTENANCE LETTER (OUTPATIENT)
Age: 45
End: 2025-05-18

## 2025-05-19 ENCOUNTER — TELEPHONE (OUTPATIENT)
Dept: FAMILY MEDICINE | Facility: CLINIC | Age: 45
End: 2025-05-19
Payer: COMMERCIAL

## 2025-05-19 DIAGNOSIS — E11.9 TYPE 2 DIABETES MELLITUS WITHOUT COMPLICATION, WITHOUT LONG-TERM CURRENT USE OF INSULIN (H): ICD-10-CM

## 2025-05-19 NOTE — TELEPHONE ENCOUNTER
Medication Question or Refill        What medication are you calling about (include dose and sig)?: test strips for glucose monitoring    Preferred Pharmacy:   [id number] - [place], [place] - [address]  [address] [facility] [address]  Phone: [phone] Fax: [phone]      Controlled Substance Agreement on file:   CSA -- Patient Level:    CSA: None found at the patient level.       Who prescribed the medication?: PCP    Do you need a refill? Yes - change prescription so that insurance will not red flag and deny coverage - as the rx says test once a day and he is now testing twice a day. Pharmacist recommends putting up to four times daily.    When did you use the medication last? Just picked up last prescription reading once a day, and will need the adjustment before running out in 25 days.    Patient offered an appointment? No      Could we send this information to you in [facility] or would you prefer to receive a phone call?:   [facility]

## 2025-05-19 NOTE — TELEPHONE ENCOUNTER
Kat-Please review, sign if agree and may close encounter.  SIG updated per Pharmacist's recommendation.    Thank you!  FABI OliverN, RN-[facility]

## 2025-05-26 DIAGNOSIS — Z00.00 PHYSICAL EXAM, ANNUAL: ICD-10-CM

## 2025-05-26 DIAGNOSIS — I10 ESSENTIAL HYPERTENSION: ICD-10-CM

## 2025-05-26 DIAGNOSIS — F41.9 ANXIETY: ICD-10-CM

## 2025-05-26 RX ORDER — HYDROCHLOROTHIAZIDE 25 MG/1
25 TABLET ORAL DAILY
Qty: 90 TABLET | Refills: 2 | Status: SHIPPED | OUTPATIENT
Start: 2025-05-26

## 2025-05-27 RX ORDER — ASPIRIN 81 MG/1
81 TABLET, COATED ORAL DAILY
Qty: 100 TABLET | Refills: 0 | Status: SHIPPED | OUTPATIENT
Start: 2025-05-27

## 2025-05-27 RX ORDER — CITALOPRAM HYDROBROMIDE 40 MG/1
40 TABLET ORAL DAILY
Qty: 90 TABLET | Refills: 0 | Status: SHIPPED | OUTPATIENT
Start: 2025-05-27

## 2025-06-26 ENCOUNTER — MYC MEDICAL ADVICE (OUTPATIENT)
Dept: FAMILY MEDICINE | Facility: CLINIC | Age: 45
End: 2025-06-26
Payer: COMMERCIAL

## 2025-06-26 DIAGNOSIS — K76.0 FATTY LIVER: ICD-10-CM

## 2025-06-26 DIAGNOSIS — E66.01 CLASS 2 SEVERE OBESITY DUE TO EXCESS CALORIES WITH SERIOUS COMORBIDITY AND BODY MASS INDEX (BMI) OF 35.0 TO 35.9 IN ADULT (H): ICD-10-CM

## 2025-06-26 DIAGNOSIS — E11.9 TYPE 2 DIABETES MELLITUS WITHOUT COMPLICATION, WITHOUT LONG-TERM CURRENT USE OF INSULIN (H): Primary | ICD-10-CM

## 2025-06-26 DIAGNOSIS — E66.812 CLASS 2 SEVERE OBESITY DUE TO EXCESS CALORIES WITH SERIOUS COMORBIDITY AND BODY MASS INDEX (BMI) OF 35.0 TO 35.9 IN ADULT (H): ICD-10-CM

## 2025-06-30 RX ORDER — SEMAGLUTIDE 2.68 MG/ML
2 INJECTION, SOLUTION SUBCUTANEOUS
Qty: 9 ML | Refills: 4 | Status: SHIPPED | OUTPATIENT
Start: 2025-06-30

## 2025-07-28 ENCOUNTER — MYC REFILL (OUTPATIENT)
Dept: FAMILY MEDICINE | Facility: CLINIC | Age: 45
End: 2025-07-28
Payer: COMMERCIAL

## 2025-07-28 DIAGNOSIS — N52.9 ERECTILE DYSFUNCTION, UNSPECIFIED ERECTILE DYSFUNCTION TYPE: ICD-10-CM

## 2025-07-28 DIAGNOSIS — F41.0 PANIC ATTACK: ICD-10-CM

## 2025-07-28 RX ORDER — SILDENAFIL 100 MG/1
100 TABLET, FILM COATED ORAL DAILY PRN
Qty: 10 TABLET | Refills: 1 | Status: SHIPPED | OUTPATIENT
Start: 2025-07-28

## 2025-07-28 RX ORDER — HYDROXYZINE HYDROCHLORIDE 25 MG/1
25 TABLET, FILM COATED ORAL 3 TIMES DAILY PRN
Qty: 30 TABLET | Refills: 2 | Status: SHIPPED | OUTPATIENT
Start: 2025-07-28

## 2025-08-10 ENCOUNTER — HEALTH MAINTENANCE LETTER (OUTPATIENT)
Age: 45
End: 2025-08-10

## 2025-08-13 ENCOUNTER — OFFICE VISIT (OUTPATIENT)
Dept: FAMILY MEDICINE | Facility: CLINIC | Age: 45
End: 2025-08-13
Payer: COMMERCIAL

## 2025-08-13 VITALS
DIASTOLIC BLOOD PRESSURE: 73 MMHG | BODY MASS INDEX: 35.84 KG/M2 | TEMPERATURE: 97.5 F | WEIGHT: 256 LBS | HEIGHT: 71 IN | HEART RATE: 75 BPM | RESPIRATION RATE: 16 BRPM | SYSTOLIC BLOOD PRESSURE: 124 MMHG

## 2025-08-13 DIAGNOSIS — I10 PRIMARY HYPERTENSION: ICD-10-CM

## 2025-08-13 DIAGNOSIS — E66.812 CLASS 2 SEVERE OBESITY DUE TO EXCESS CALORIES WITH SERIOUS COMORBIDITY AND BODY MASS INDEX (BMI) OF 35.0 TO 35.9 IN ADULT (H): ICD-10-CM

## 2025-08-13 DIAGNOSIS — E66.01 CLASS 2 SEVERE OBESITY DUE TO EXCESS CALORIES WITH SERIOUS COMORBIDITY AND BODY MASS INDEX (BMI) OF 35.0 TO 35.9 IN ADULT (H): ICD-10-CM

## 2025-08-13 DIAGNOSIS — E11.9 TYPE 2 DIABETES MELLITUS WITHOUT COMPLICATION, WITHOUT LONG-TERM CURRENT USE OF INSULIN (H): Primary | ICD-10-CM

## 2025-08-13 LAB
EST. AVERAGE GLUCOSE BLD GHB EST-MCNC: 137 MG/DL
HBA1C MFR BLD: 6.4 % (ref 0–5.6)

## 2025-08-13 PROCEDURE — 82043 UR ALBUMIN QUANTITATIVE: CPT | Performed by: PHYSICIAN ASSISTANT

## 2025-08-13 PROCEDURE — 36415 COLL VENOUS BLD VENIPUNCTURE: CPT | Performed by: PHYSICIAN ASSISTANT

## 2025-08-13 PROCEDURE — 80061 LIPID PANEL: CPT | Performed by: PHYSICIAN ASSISTANT

## 2025-08-13 PROCEDURE — 82570 ASSAY OF URINE CREATININE: CPT | Performed by: PHYSICIAN ASSISTANT

## 2025-08-13 PROCEDURE — 83036 HEMOGLOBIN GLYCOSYLATED A1C: CPT | Performed by: PHYSICIAN ASSISTANT

## 2025-08-14 LAB
CHOLEST SERPL-MCNC: 149 MG/DL
CREAT UR-MCNC: 127 MG/DL
FASTING STATUS PATIENT QL REPORTED: NO
HDLC SERPL-MCNC: 30 MG/DL
LDLC SERPL CALC-MCNC: 84 MG/DL
MICROALBUMIN UR-MCNC: <12 MG/L
MICROALBUMIN/CREAT UR: NORMAL MG/G{CREAT}
NONHDLC SERPL-MCNC: 119 MG/DL
TRIGL SERPL-MCNC: 173 MG/DL

## (undated) RX ORDER — FENTANYL CITRATE 50 UG/ML
INJECTION, SOLUTION INTRAMUSCULAR; INTRAVENOUS
Status: DISPENSED
Start: 2023-09-22